# Patient Record
Sex: FEMALE | Race: WHITE | NOT HISPANIC OR LATINO | Employment: UNEMPLOYED | URBAN - METROPOLITAN AREA
[De-identification: names, ages, dates, MRNs, and addresses within clinical notes are randomized per-mention and may not be internally consistent; named-entity substitution may affect disease eponyms.]

---

## 2017-03-22 ENCOUNTER — OFFICE VISIT (OUTPATIENT)
Dept: ONCOLOGY | Facility: CLINIC | Age: 67
End: 2017-03-22

## 2017-03-22 ENCOUNTER — INFUSION (OUTPATIENT)
Dept: ONCOLOGY | Facility: HOSPITAL | Age: 67
End: 2017-03-22
Attending: INTERNAL MEDICINE

## 2017-03-22 ENCOUNTER — LAB (OUTPATIENT)
Dept: LAB | Facility: HOSPITAL | Age: 67
End: 2017-03-22

## 2017-03-22 VITALS
TEMPERATURE: 98.1 F | SYSTOLIC BLOOD PRESSURE: 152 MMHG | DIASTOLIC BLOOD PRESSURE: 80 MMHG | RESPIRATION RATE: 16 BRPM | OXYGEN SATURATION: 97 % | HEART RATE: 77 BPM | BODY MASS INDEX: 31.96 KG/M2 | WEIGHT: 191.8 LBS

## 2017-03-22 DIAGNOSIS — E53.8 B12 DEFICIENCY: ICD-10-CM

## 2017-03-22 DIAGNOSIS — E53.8 VITAMIN B12 DEFICIENCY: Primary | ICD-10-CM

## 2017-03-22 LAB
BASOPHILS # BLD AUTO: 0 10*3/MM3 (ref 0–0.1)
BASOPHILS NFR BLD AUTO: 0 % (ref 0–1.1)
DEPRECATED RDW RBC AUTO: 48.5 FL (ref 37–49)
EOSINOPHIL # BLD AUTO: 0 10*3/MM3 (ref 0–0.36)
EOSINOPHIL NFR BLD AUTO: 0 % (ref 1–5)
ERYTHROCYTE [DISTWIDTH] IN BLOOD BY AUTOMATED COUNT: 14.1 % (ref 11.7–14.5)
HCT VFR BLD AUTO: 35.9 % (ref 34–45)
HGB BLD-MCNC: 12.4 G/DL (ref 11.5–14.9)
IMM GRANULOCYTES # BLD: 0.01 10*3/MM3 (ref 0–0.03)
IMM GRANULOCYTES NFR BLD: 0.3 % (ref 0–0.5)
LYMPHOCYTES # BLD AUTO: 2.43 10*3/MM3 (ref 1–3.5)
LYMPHOCYTES NFR BLD AUTO: 63 % (ref 20–49)
MCH RBC QN AUTO: 32.5 PG (ref 27–33)
MCHC RBC AUTO-ENTMCNC: 34.5 G/DL (ref 32–35)
MCV RBC AUTO: 94 FL (ref 83–97)
MONOCYTES # BLD AUTO: 0.15 10*3/MM3 (ref 0.25–0.8)
MONOCYTES NFR BLD AUTO: 3.9 % (ref 4–12)
NEUTROPHILS # BLD AUTO: 1.27 10*3/MM3 (ref 1.5–7)
NEUTROPHILS NFR BLD AUTO: 32.8 % (ref 39–75)
NRBC BLD MANUAL-RTO: 0 /100 WBC (ref 0–0)
PLATELET # BLD AUTO: 49 10*3/MM3 (ref 150–375)
PMV BLD AUTO: 11.7 FL (ref 8.9–12.1)
RBC # BLD AUTO: 3.82 10*6/MM3 (ref 3.9–5)
WBC NRBC COR # BLD: 3.86 10*3/MM3 (ref 4–10)

## 2017-03-22 PROCEDURE — 99213 OFFICE O/P EST LOW 20 MIN: CPT | Performed by: INTERNAL MEDICINE

## 2017-03-22 PROCEDURE — 85025 COMPLETE CBC W/AUTO DIFF WBC: CPT

## 2017-03-22 PROCEDURE — 36416 COLLJ CAPILLARY BLOOD SPEC: CPT

## 2017-03-22 PROCEDURE — 25010000002 CYANOCOBALAMIN PER 1000 MCG: Performed by: INTERNAL MEDICINE

## 2017-03-22 PROCEDURE — 96372 THER/PROPH/DIAG INJ SC/IM: CPT | Performed by: INTERNAL MEDICINE

## 2017-03-22 RX ORDER — VENLAFAXINE 50 MG/1
TABLET ORAL 2 TIMES DAILY
Refills: 0 | COMMUNITY
Start: 2017-02-14

## 2017-03-22 RX ORDER — CYANOCOBALAMIN 1000 UG/ML
1000 INJECTION, SOLUTION INTRAMUSCULAR; SUBCUTANEOUS ONCE
Status: CANCELLED | OUTPATIENT
Start: 2017-04-19

## 2017-03-22 RX ORDER — TIZANIDINE 4 MG/1
TABLET ORAL
Refills: 0 | COMMUNITY
Start: 2017-03-15

## 2017-03-22 RX ORDER — LORAZEPAM 0.5 MG/1
TABLET ORAL
Refills: 0 | COMMUNITY
Start: 2017-03-06

## 2017-03-22 RX ORDER — CYANOCOBALAMIN 1000 UG/ML
1000 INJECTION, SOLUTION INTRAMUSCULAR; SUBCUTANEOUS ONCE
Status: COMPLETED | OUTPATIENT
Start: 2017-03-22 | End: 2017-03-22

## 2017-03-22 RX ADMIN — CYANOCOBALAMIN 1000 MCG: 1000 INJECTION, SOLUTION INTRAMUSCULAR; SUBCUTANEOUS at 15:20

## 2017-03-22 NOTE — PROGRESS NOTES
REASONS FOR FOLLOWUP:    1.  B12 deficiency.   2. Cirrhosis.   3. Splenomegaly.   4. Leukopenia.   5. Probable immune thrombocytopenia.   6. B cell chronic lymphocytic leukemia.   7. Bone marrow aspiration and biopsy 10/15/2012  8. Myelodysplasia.     History of Present Illness    Mrs. Sands returns today in somewhat delayed follow-up.  She has the expected and persistent cytopenias though denies any signs or symptoms related to her anemia or thrombocytopenia.    Past Medical History:   Diagnosis Date   • Arthritis    • B12 deficiency    • Cirrhosis    • Diabetes mellitus    • Hypertension    • Hypothyroidism    • Leukopenia    • Lymphocytic leukemia     B cell   • Myelodysplasia (myelodysplastic syndrome)    • Splenomegaly    • Thrombocytopenia        ONCOLOGIC HISTORY:  (History from previous dates can be found in the separate document.)    Current Outpatient Prescriptions on File Prior to Visit   Medication Sig Dispense Refill   • doxepin (SINEquan) 100 MG capsule take 1 capsule by mouth at bedtime  0   • HYDROcodone-acetaminophen (NORCO)  MG per tablet take 1 tablet by mouth every 6 hours if needed for pain  0   • levothyroxine (SYNTHROID, LEVOTHROID) 100 MCG tablet Take 1 tablet(s) every day by oral route.  0   • QUEtiapine (SEROquel) 300 MG tablet 2 tablets daily.  0   • [DISCONTINUED] lithium carbonate 150 MG capsule take 1 capsule by mouth at bedtime  0   • [DISCONTINUED] LORazepam (ATIVAN PO) Take  by mouth 3 (three) times a day.     • [DISCONTINUED] venlafaxine (EFFEXOR) 75 MG tablet 1 tablet daily.  0     No current facility-administered medications on file prior to visit.        ALLERGIES:     Allergies   Allergen Reactions   • Meperidine Hcl Nausea And Vomiting   • Sumycin [Tetracycline]        Social History     Social History   • Marital status:      Spouse name: N/A   • Number of children: N/A   • Years of education: N/A     Occupational History   •  Disabled     Social History  Main Topics   • Smoking status: Current Every Day Smoker   • Smokeless tobacco: Not on file   • Alcohol use Not on file   • Drug use: Not on file   • Sexual activity: Not on file     Other Topics Concern   • Not on file     Social History Narrative         Cancer-related family history is not on file.     Review of Systems  A comprehensive 14 point review of systems was performed and was negative except as mentioned.    Objective      Vitals:    03/22/17 1422   BP: 152/80   Pulse: 77   Resp: 16   Temp: 98.1 °F (36.7 °C)   TempSrc: Oral   SpO2: 97%   Weight: 191 lb 12.8 oz (87 kg)   Height: Comment: patient refused to get a new height   PainSc: 4  Comment: pain in RT shoulder,back leg pain     Current Status 3/22/2017   ECOG score 0       Physical Exam  /80  Pulse 77  Temp 98.1 °F (36.7 °C) (Oral)   Resp 16  Wt 191 lb 12.8 oz (87 kg)  SpO2 97%  BMI 31.96 kg/m2    General Appearance:    Alert, cooperative, no distress, appears stated age   Head:    Normocephalic, without obvious abnormality, atraumatic   Eyes:    PERRL, conjunctiva/corneas clear, EOM's intact, fundi     benign, both eyes        Ears:    Normal TM's and external ear canals, both ears   Nose:   Nares normal, septum midline, mucosa normal, no drainage    or sinus tenderness   Throat:   Lips, mucosa, and tongue normal; teeth and gums normal   Neck:   Supple, symmetrical, trachea midline, no adenopathy;        thyroid:  No enlargement/tenderness/nodules; no carotid    bruit or JVD   Back:     Symmetric, no curvature, ROM normal, no CVA tenderness   Lungs:     Clear to auscultation bilaterally, respirations unlabored   Chest wall:    No tenderness or deformity   Heart:    Regular rate and rhythm, S1 and S2 normal, no murmur, rub    or gallop   Abdomen:     Soft, non-tender, bowel sounds active all four quadrants,     no masses, no organomegaly           Extremities:   Extremities normal, atraumatic, no cyanosis or edema   Pulses:   2+ and  symmetric all extremities   Skin:   Skin color, texture, turgor normal, no rashes or lesions   Lymph nodes:   Cervical, supraclavicular, and axillary nodes normal   Neurologic:   CNII-XII intact. Normal strength, sensation and reflexes       throughout       RECENT LABS:  Hematology WBC   Date Value Ref Range Status   03/22/2017 3.86 (L) 4.00 - 10.00 10*3/mm3 Final     RBC   Date Value Ref Range Status   03/22/2017 3.82 (L) 3.90 - 5.00 10*6/mm3 Final     Hemoglobin   Date Value Ref Range Status   03/22/2017 12.4 11.5 - 14.9 g/dL Final     Hematocrit   Date Value Ref Range Status   03/22/2017 35.9 34.0 - 45.0 % Final     Platelets   Date Value Ref Range Status   03/22/2017 49 (L) 150 - 375 10*3/mm3 Final        Assessment/Plan   B12 deficiency: She will undergo B12 injection today and monthly hereafter she will return in the course of 6 months for M.D. Review.    Thrombocytopenia:slight increase in consumption per the purple Brianne on the way home though largely splenomegaly.no significant bleednig risk              Cc:  Salinas Blair MD

## 2017-04-19 ENCOUNTER — APPOINTMENT (OUTPATIENT)
Dept: LAB | Facility: HOSPITAL | Age: 67
End: 2017-04-19
Attending: INTERNAL MEDICINE

## 2017-04-19 ENCOUNTER — APPOINTMENT (OUTPATIENT)
Dept: ONCOLOGY | Facility: HOSPITAL | Age: 67
End: 2017-04-19
Attending: INTERNAL MEDICINE

## 2017-07-12 ENCOUNTER — APPOINTMENT (OUTPATIENT)
Dept: LAB | Facility: HOSPITAL | Age: 67
End: 2017-07-12

## 2017-07-12 ENCOUNTER — APPOINTMENT (OUTPATIENT)
Dept: ONCOLOGY | Facility: HOSPITAL | Age: 67
End: 2017-07-12

## 2017-09-05 DIAGNOSIS — E53.8 VITAMIN B12 DEFICIENCY: Primary | ICD-10-CM

## 2022-08-10 RX ORDER — CYANOCOBALAMIN 1000 UG/ML
1000 INJECTION, SOLUTION INTRAMUSCULAR; SUBCUTANEOUS ONCE
OUTPATIENT
Start: 2022-08-10

## 2022-08-10 NOTE — PROGRESS NOTES
REFERRING PROVIDER:    Jai Monroy MD  [address]  [address]  [place] [address]    REASON FOR CONSULTATION:  Thrombocytopenia    HISTORY OF PRESENT ILLNESS:  Gloria Sands is a 72 y.o. female who is referred today for further evaluation of thrombocytopenia.    Formerly followed by Dr. Casey with the practice for cytopenias, cirrhosis, splenomegaly, suspected ITP, MDS (?),  Chronic lymphocytic leukemia, B12 deficiency. Last seen in 2017.     Flow cytometry on 10/13/2010 showed a monoclonal B cell population consistent with B cell CLL vs monoclonal B cell lymphocytosis. The spleen measured 20 cm. MMA was elevated though B12 was normal at 608.     Bone marrow biopsy on 10/15/08251 showed a monoclonal B cell population, normal chromosomes. She was treated with a steroid taper which did not help her platelet count which was in the 50,000 to 70,000 range at the time.    Platelets on 3/22/2017 were 49,000 with a WBC of 3.86, HGB 12.4    Labs on 7/11/22 showed a hgb of 9.9, platelets 43,000, WBC 5.2. Platelets stable since 2020.     She cannot tell me anything that is going on today.  The person who brought her is from maintenance at the facility where she lives and also knows nothing about what is going on.        Past Medical History:   Diagnosis Date   • Arthritis    • B12 deficiency    • Cirrhosis (HCC)    • Diabetes mellitus (HCC)    • Hypertension    • Hypothyroidism    • Leukopenia    • Lymphocytic leukemia (HCC)     B cell   • Myelodysplasia (myelodysplastic syndrome) (HCC)    • Splenomegaly    • Thrombocytopenia (HCC)        Past Surgical History:   Procedure Laterality Date   • BONE MARROW BIOPSY W/ ASPIRATION  10/15/2012   • HYSTERECTOMY     • LUMBAR SPINE SURGERY     • OTHER SURGICAL HISTORY      MULTIPLE SURGERIES FOR PAIN MANAGEMENT APPLIANCES       SOCIAL HISTORY:   reports that she has been smoking. She does not have any smokeless tobacco history on file. No history on file for alcohol  use and drug use.    FAMILY HISTORY:  family history is not on file.    ALLERGIES:  Allergies   Allergen Reactions   • Meperidine Hcl Nausea And Vomiting   • Sumycin [Tetracycline]        MEDICATIONS:  The medication list has been reviewed with the patient by the medical assistant, and the list has been updated in the electronic medical record, which I reviewed.  Medication dosages and frequencies were confirmed to be accurate.    Review of Systems   Unable to perform ROS: Dementia       PHYSICAL EXAMINATION:  Vitals:    08/17/22 1510   BP: 133/55   Pulse: 54   Resp: 16   Temp: 97.8 °F (36.6 °C)   TempSrc: Temporal   SpO2: 92%   Weight: Comment: pt unable to stand   PainSc: 0-No pain     Unkempt in appearance.  Sitting in a wheelchair.  Disoriented.    DIAGNOSTIC DATA:  CBC & Differential (08/17/2022 14:32)    IMAGING:    None reviewed    ASSESSMENT:  This is a 72 y.o. 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    *Paranoid schizophrenia    *Dementia    PLAN:   1. Blood counts have been very stable for many years, related mostly to cirrhosis and splenomegaly.  Her white blood cell count is normal.  There is a lymphocytosis.  This has been evaluated in the past and there is a monoclonal B-cell population but there is nothing to do about this.  There is nothing to do for this unfortunate lady from a hematologic standpoint.  She can follow-up here as needed.    ORDERS PLACED DURING THIS ENCOUNTER  Orders Placed This Encounter   Procedures   • CBC & Differential     Standing Status:   Future     Number of  Occurrences:   1     Standing Expiration Date:   8/10/2023     Order Specific Question:   Manual Differential     Answer:   No     Order Specific Question:   Release to patient     Answer:   Routine Release     Copy of this note to [facility] and rehabilitation

## 2022-08-17 ENCOUNTER — CONSULT (OUTPATIENT)
Dept: ONCOLOGY | Facility: CLINIC | Age: 72
End: 2022-08-17

## 2022-08-17 ENCOUNTER — LAB (OUTPATIENT)
Dept: LAB | Facility: HOSPITAL | Age: 72
End: 2022-08-17

## 2022-08-17 VITALS
DIASTOLIC BLOOD PRESSURE: 55 MMHG | HEART RATE: 54 BPM | OXYGEN SATURATION: 92 % | SYSTOLIC BLOOD PRESSURE: 133 MMHG | RESPIRATION RATE: 16 BRPM | TEMPERATURE: 97.8 F

## 2022-08-17 DIAGNOSIS — D69.6 THROMBOCYTOPENIA: Primary | ICD-10-CM

## 2022-08-17 DIAGNOSIS — D69.6 THROMBOCYTOPENIA: ICD-10-CM

## 2022-08-17 LAB
BASOPHILS # BLD AUTO: 0.02 10*3/MM3 (ref 0–0.2)
BASOPHILS NFR BLD AUTO: 0.2 % (ref 0–1.5)
DEPRECATED RDW RBC AUTO: 52.2 FL (ref 37–54)
EOSINOPHIL # BLD AUTO: 0.01 10*3/MM3 (ref 0–0.4)
EOSINOPHIL NFR BLD AUTO: 0.1 % (ref 0.3–6.2)
ERYTHROCYTE [DISTWIDTH] IN BLOOD BY AUTOMATED COUNT: 14.7 % (ref 12.3–15.4)
HCT VFR BLD AUTO: 30.6 % (ref 34–46.6)
HGB BLD-MCNC: 10.5 G/DL (ref 12–15.9)
IMM GRANULOCYTES # BLD AUTO: 0.02 10*3/MM3 (ref 0–0.05)
IMM GRANULOCYTES NFR BLD AUTO: 0.2 % (ref 0–0.5)
LYMPHOCYTES # BLD AUTO: 6.54 10*3/MM3 (ref 0.7–3.1)
LYMPHOCYTES NFR BLD AUTO: 71.9 % (ref 19.6–45.3)
MCH RBC QN AUTO: 33.1 PG (ref 26.6–33)
MCHC RBC AUTO-ENTMCNC: 34.3 G/DL (ref 31.5–35.7)
MCV RBC AUTO: 96.5 FL (ref 79–97)
MONOCYTES # BLD AUTO: 0.53 10*3/MM3 (ref 0.1–0.9)
MONOCYTES NFR BLD AUTO: 5.8 % (ref 5–12)
NEUTROPHILS NFR BLD AUTO: 1.97 10*3/MM3 (ref 1.7–7)
NEUTROPHILS NFR BLD AUTO: 21.8 % (ref 42.7–76)
NRBC BLD AUTO-RTO: 0.2 /100 WBC (ref 0–0.2)
PLATELET # BLD AUTO: 54 10*3/MM3 (ref 140–450)
PMV BLD AUTO: 12.5 FL (ref 6–12)
RBC # BLD AUTO: 3.17 10*6/MM3 (ref 3.77–5.28)
WBC NRBC COR # BLD: 9.09 10*3/MM3 (ref 3.4–10.8)

## 2022-08-17 PROCEDURE — 99203 OFFICE O/P NEW LOW 30 MIN: CPT | Performed by: INTERNAL MEDICINE

## 2022-08-17 PROCEDURE — 36415 COLL VENOUS BLD VENIPUNCTURE: CPT

## 2022-08-17 PROCEDURE — 85025 COMPLETE CBC W/AUTO DIFF WBC: CPT

## 2022-08-17 RX ORDER — DONEPEZIL HYDROCHLORIDE 10 MG/1
TABLET, FILM COATED ORAL
COMMUNITY
Start: 2022-08-15

## 2022-08-17 RX ORDER — ARIPIPRAZOLE 2 MG/1
TABLET ORAL
COMMUNITY
Start: 2022-07-25

## 2022-08-17 RX ORDER — TRIAMCINOLONE ACETONIDE 5 MG/G
CREAM TOPICAL
COMMUNITY
Start: 2022-06-17

## 2022-08-17 RX ORDER — SERTRALINE HYDROCHLORIDE 100 MG/1
TABLET, FILM COATED ORAL
COMMUNITY
Start: 2022-07-31

## 2022-08-17 RX ORDER — LORAZEPAM 0.5 MG/1
TABLET ORAL
COMMUNITY

## 2022-08-17 RX ORDER — MIRTAZAPINE 7.5 MG/1
TABLET, FILM COATED ORAL
COMMUNITY
Start: 2022-07-29

## 2022-08-17 RX ORDER — LOPERAMIDE HYDROCHLORIDE 2 MG/1
CAPSULE ORAL
COMMUNITY
Start: 2022-08-14

## 2023-01-01 ENCOUNTER — HOSPITAL ENCOUNTER (INPATIENT)
Facility: HOSPITAL | Age: 73
LOS: 7 days | Discharge: NURSING, REHAB OR LONG-TERM CARE | End: 2023-10-24
Attending: EMERGENCY MEDICINE | Admitting: INTERNAL MEDICINE
Payer: MEDICARE

## 2023-01-01 ENCOUNTER — TELEPHONE (OUTPATIENT)
Dept: ONCOLOGY | Facility: CLINIC | Age: 73
End: 2023-01-01

## 2023-01-01 ENCOUNTER — APPOINTMENT (OUTPATIENT)
Dept: GENERAL RADIOLOGY | Facility: HOSPITAL | Age: 73
End: 2023-01-01
Payer: MEDICARE

## 2023-01-01 ENCOUNTER — HOSPITAL ENCOUNTER (INPATIENT)
Facility: HOSPITAL | Age: 73
LOS: 3 days | End: 2023-10-27
Attending: INTERNAL MEDICINE | Admitting: INTERNAL MEDICINE
Payer: COMMERCIAL

## 2023-01-01 ENCOUNTER — APPOINTMENT (OUTPATIENT)
Dept: CT IMAGING | Facility: HOSPITAL | Age: 73
End: 2023-01-01
Payer: MEDICARE

## 2023-01-01 VITALS — RESPIRATION RATE: 12 BRPM | OXYGEN SATURATION: 91 % | HEART RATE: 65 BPM

## 2023-01-01 VITALS
DIASTOLIC BLOOD PRESSURE: 40 MMHG | TEMPERATURE: 98.4 F | HEART RATE: 159 BPM | RESPIRATION RATE: 8 BRPM | SYSTOLIC BLOOD PRESSURE: 67 MMHG | OXYGEN SATURATION: 83 % | WEIGHT: 89.3 LBS | HEIGHT: 63 IN | BODY MASS INDEX: 15.82 KG/M2

## 2023-01-01 DIAGNOSIS — R62.7 FAILURE TO THRIVE IN ADULT: ICD-10-CM

## 2023-01-01 DIAGNOSIS — R63.8 DECREASED ORAL INTAKE: ICD-10-CM

## 2023-01-01 DIAGNOSIS — D53.9 MACROCYTIC ANEMIA: ICD-10-CM

## 2023-01-01 DIAGNOSIS — R79.89 ELEVATED TROPONIN: ICD-10-CM

## 2023-01-01 DIAGNOSIS — D69.6 THROMBOCYTOPENIA: ICD-10-CM

## 2023-01-01 DIAGNOSIS — E87.0 HYPERNATREMIA: Primary | ICD-10-CM

## 2023-01-01 DIAGNOSIS — C91.10 CLL (CHRONIC LYMPHOCYTIC LEUKEMIA): ICD-10-CM

## 2023-01-01 DIAGNOSIS — D72.820 LYMPHOCYTOSIS: ICD-10-CM

## 2023-01-01 DIAGNOSIS — R09.02 HYPOXIA: ICD-10-CM

## 2023-01-01 DIAGNOSIS — J18.9 PNEUMONIA DUE TO INFECTIOUS ORGANISM, UNSPECIFIED LATERALITY, UNSPECIFIED PART OF LUNG: ICD-10-CM

## 2023-01-01 LAB
ALBUMIN SERPL-MCNC: 2.8 G/DL (ref 3.5–5.2)
ALBUMIN/GLOB SERPL: 0.9 G/DL
ALP SERPL-CCNC: 86 U/L (ref 39–117)
ALT SERPL W P-5'-P-CCNC: 9 U/L (ref 1–33)
AMMONIA BLD-SCNC: 31 UMOL/L (ref 11–51)
ANION GAP SERPL CALCULATED.3IONS-SCNC: 10 MMOL/L (ref 5–15)
ANION GAP SERPL CALCULATED.3IONS-SCNC: 10.5 MMOL/L (ref 5–15)
ANION GAP SERPL CALCULATED.3IONS-SCNC: 12 MMOL/L (ref 5–15)
ANION GAP SERPL CALCULATED.3IONS-SCNC: 8 MMOL/L (ref 5–15)
ARTERIAL PATENCY WRIST A: POSITIVE
AST SERPL-CCNC: 29 U/L (ref 1–32)
ATMOSPHERIC PRESS: 750.9 MMHG
B PARAPERT DNA SPEC QL NAA+PROBE: NOT DETECTED
B PERT DNA SPEC QL NAA+PROBE: NOT DETECTED
BACTERIA ISLT: NORMAL
BACTERIA SPEC AEROBE CULT: NORMAL
BACTERIA SPEC AEROBE CULT: NORMAL
BACTERIA UR QL AUTO: NORMAL /HPF
BASE EXCESS BLDA CALC-SCNC: 3.3 MMOL/L (ref 0–2)
BASOPHILS # BLD MANUAL: 0.17 10*3/MM3 (ref 0–0.2)
BASOPHILS NFR BLD MANUAL: 1 % (ref 0–1.5)
BDY SITE: ABNORMAL
BILIRUB SERPL-MCNC: 2 MG/DL (ref 0–1.2)
BILIRUB UR QL STRIP: NEGATIVE
BUN SERPL-MCNC: 24 MG/DL (ref 8–23)
BUN SERPL-MCNC: 31 MG/DL (ref 8–23)
BUN SERPL-MCNC: 34 MG/DL (ref 8–23)
BUN SERPL-MCNC: 41 MG/DL (ref 8–23)
BUN/CREAT SERPL: 53.3 (ref 7–25)
BUN/CREAT SERPL: 59.6 (ref 7–25)
BUN/CREAT SERPL: 60.7 (ref 7–25)
BUN/CREAT SERPL: 63.1 (ref 7–25)
BURR CELLS BLD QL SMEAR: ABNORMAL
BURR CELLS BLD QL SMEAR: ABNORMAL
C PNEUM DNA NPH QL NAA+NON-PROBE: NOT DETECTED
CALCIUM SPEC-SCNC: 7.4 MG/DL (ref 8.6–10.5)
CALCIUM SPEC-SCNC: 7.9 MG/DL (ref 8.6–10.5)
CALCIUM SPEC-SCNC: 8.5 MG/DL (ref 8.6–10.5)
CALCIUM SPEC-SCNC: 9.8 MG/DL (ref 8.6–10.5)
CHLORIDE SERPL-SCNC: 117 MMOL/L (ref 98–107)
CHLORIDE SERPL-SCNC: 126 MMOL/L (ref 98–107)
CHLORIDE SERPL-SCNC: 133 MMOL/L (ref 98–107)
CHLORIDE SERPL-SCNC: 135 MMOL/L (ref 98–107)
CLARITY UR: CLEAR
CO2 BLDA-SCNC: 28.5 MMOL/L (ref 23–27)
CO2 SERPL-SCNC: 20 MMOL/L (ref 22–29)
CO2 SERPL-SCNC: 22 MMOL/L (ref 22–29)
CO2 SERPL-SCNC: 22.5 MMOL/L (ref 22–29)
CO2 SERPL-SCNC: 30 MMOL/L (ref 22–29)
COLOR UR: ABNORMAL
CREAT SERPL-MCNC: 0.45 MG/DL (ref 0.57–1)
CREAT SERPL-MCNC: 0.52 MG/DL (ref 0.57–1)
CREAT SERPL-MCNC: 0.56 MG/DL (ref 0.57–1)
CREAT SERPL-MCNC: 0.65 MG/DL (ref 0.57–1)
D-LACTATE SERPL-SCNC: 2.3 MMOL/L (ref 0.5–2)
D-LACTATE SERPL-SCNC: 2.7 MMOL/L (ref 0.5–2)
D-LACTATE SERPL-SCNC: 3.5 MMOL/L (ref 0.5–2)
D-LACTATE SERPL-SCNC: 3.6 MMOL/L (ref 0.5–2)
D-LACTATE SERPL-SCNC: 3.6 MMOL/L (ref 0.5–2)
D-LACTATE SERPL-SCNC: 3.9 MMOL/L (ref 0.5–2)
DEPRECATED RDW RBC AUTO: 47.5 FL (ref 37–54)
DEPRECATED RDW RBC AUTO: 50.6 FL (ref 37–54)
DEPRECATED RDW RBC AUTO: 52.5 FL (ref 37–54)
DEPRECATED RDW RBC AUTO: 53.1 FL (ref 37–54)
DEVICE COMMENT: ABNORMAL
EGFRCR SERPLBLD CKD-EPI 2021: 100.5 ML/MIN/1.73
EGFRCR SERPLBLD CKD-EPI 2021: 91.9 ML/MIN/1.73
EGFRCR SERPLBLD CKD-EPI 2021: 95.3 ML/MIN/1.73
EGFRCR SERPLBLD CKD-EPI 2021: 97 ML/MIN/1.73
ERYTHROCYTE [DISTWIDTH] IN BLOOD BY AUTOMATED COUNT: 13.8 % (ref 12.3–15.4)
ERYTHROCYTE [DISTWIDTH] IN BLOOD BY AUTOMATED COUNT: 14.1 % (ref 12.3–15.4)
ERYTHROCYTE [DISTWIDTH] IN BLOOD BY AUTOMATED COUNT: 14.4 % (ref 12.3–15.4)
ERYTHROCYTE [DISTWIDTH] IN BLOOD BY AUTOMATED COUNT: 14.6 % (ref 12.3–15.4)
FLUAV SUBTYP SPEC NAA+PROBE: NOT DETECTED
FLUBV RNA ISLT QL NAA+PROBE: NOT DETECTED
GAS FLOW AIRWAY: 6 LPM
GEN 5 2HR TROPONIN T REFLEX: 140 NG/L
GLOBULIN UR ELPH-MCNC: 3 GM/DL
GLUCOSE BLDC GLUCOMTR-MCNC: 110 MG/DL (ref 70–130)
GLUCOSE BLDC GLUCOMTR-MCNC: 119 MG/DL (ref 70–130)
GLUCOSE BLDC GLUCOMTR-MCNC: 151 MG/DL (ref 70–130)
GLUCOSE BLDC GLUCOMTR-MCNC: 162 MG/DL (ref 70–130)
GLUCOSE BLDC GLUCOMTR-MCNC: 169 MG/DL (ref 70–130)
GLUCOSE BLDC GLUCOMTR-MCNC: 186 MG/DL (ref 70–130)
GLUCOSE BLDC GLUCOMTR-MCNC: 187 MG/DL (ref 70–130)
GLUCOSE BLDC GLUCOMTR-MCNC: 191 MG/DL (ref 70–130)
GLUCOSE BLDC GLUCOMTR-MCNC: 205 MG/DL (ref 70–130)
GLUCOSE BLDC GLUCOMTR-MCNC: 211 MG/DL (ref 70–130)
GLUCOSE SERPL-MCNC: 148 MG/DL (ref 65–99)
GLUCOSE SERPL-MCNC: 167 MG/DL (ref 65–99)
GLUCOSE SERPL-MCNC: 194 MG/DL (ref 65–99)
GLUCOSE SERPL-MCNC: 215 MG/DL (ref 65–99)
GLUCOSE UR STRIP-MCNC: NEGATIVE MG/DL
HADV DNA SPEC NAA+PROBE: NOT DETECTED
HBA1C MFR BLD: 5.4 % (ref 4.8–5.6)
HCO3 BLDA-SCNC: 27.3 MMOL/L (ref 22–28)
HCOV 229E RNA SPEC QL NAA+PROBE: NOT DETECTED
HCOV HKU1 RNA SPEC QL NAA+PROBE: NOT DETECTED
HCOV NL63 RNA SPEC QL NAA+PROBE: NOT DETECTED
HCOV OC43 RNA SPEC QL NAA+PROBE: NOT DETECTED
HCT VFR BLD AUTO: 27.9 % (ref 34–46.6)
HCT VFR BLD AUTO: 28.8 % (ref 34–46.6)
HCT VFR BLD AUTO: 32.9 % (ref 34–46.6)
HCT VFR BLD AUTO: 37.1 % (ref 34–46.6)
HEMODILUTION: NO
HGB BLD-MCNC: 10 G/DL (ref 12–15.9)
HGB BLD-MCNC: 11.2 G/DL (ref 12–15.9)
HGB BLD-MCNC: 12.9 G/DL (ref 12–15.9)
HGB BLD-MCNC: 9.8 G/DL (ref 12–15.9)
HGB UR QL STRIP.AUTO: NEGATIVE
HMPV RNA NPH QL NAA+NON-PROBE: NOT DETECTED
HPIV1 RNA ISLT QL NAA+PROBE: NOT DETECTED
HPIV2 RNA SPEC QL NAA+PROBE: NOT DETECTED
HPIV3 RNA NPH QL NAA+PROBE: NOT DETECTED
HPIV4 P GENE NPH QL NAA+PROBE: NOT DETECTED
HYALINE CASTS UR QL AUTO: NORMAL /LPF
KETONES UR QL STRIP: ABNORMAL
LAB AP CASE REPORT: NORMAL
LEUKOCYTE ESTERASE UR QL STRIP.AUTO: ABNORMAL
LYMPHOCYTES # BLD MANUAL: 32.37 10*3/MM3 (ref 0.7–3.1)
LYMPHOCYTES # BLD MANUAL: 4.79 10*3/MM3 (ref 0.7–3.1)
LYMPHOCYTES # BLD MANUAL: 6.32 10*3/MM3 (ref 0.7–3.1)
LYMPHOCYTES # BLD MANUAL: 9.44 10*3/MM3 (ref 0.7–3.1)
LYMPHOCYTES NFR BLD MANUAL: 1.6 % (ref 5–12)
LYMPHOCYTES NFR BLD MANUAL: 2 % (ref 5–12)
LYMPHOCYTES NFR BLD MANUAL: 3 % (ref 5–12)
LYMPHOCYTES NFR BLD MANUAL: 3 % (ref 5–12)
M PNEUMO IGG SER IA-ACNC: NOT DETECTED
MAGNESIUM SERPL-MCNC: 3 MG/DL (ref 1.6–2.4)
MCH RBC QN AUTO: 33.9 PG (ref 26.6–33)
MCH RBC QN AUTO: 34.4 PG (ref 26.6–33)
MCH RBC QN AUTO: 34.7 PG (ref 26.6–33)
MCH RBC QN AUTO: 34.9 PG (ref 26.6–33)
MCHC RBC AUTO-ENTMCNC: 34 G/DL (ref 31.5–35.7)
MCHC RBC AUTO-ENTMCNC: 34 G/DL (ref 31.5–35.7)
MCHC RBC AUTO-ENTMCNC: 34.8 G/DL (ref 31.5–35.7)
MCHC RBC AUTO-ENTMCNC: 35.8 G/DL (ref 31.5–35.7)
MCV RBC AUTO: 102.5 FL (ref 79–97)
MCV RBC AUTO: 95.9 FL (ref 79–97)
MCV RBC AUTO: 99.7 FL (ref 79–97)
MCV RBC AUTO: 99.7 FL (ref 79–97)
MODALITY: ABNORMAL
MONOCYTES # BLD: 0.24 10*3/MM3 (ref 0.1–0.9)
MONOCYTES # BLD: 0.25 10*3/MM3 (ref 0.1–0.9)
MONOCYTES # BLD: 0.5 10*3/MM3 (ref 0.1–0.9)
MONOCYTES # BLD: 1.21 10*3/MM3 (ref 0.1–0.9)
MRSA DNA SPEC QL NAA+PROBE: NORMAL
NEUTROPHILS # BLD AUTO: 11.06 10*3/MM3 (ref 1.7–7)
NEUTROPHILS # BLD AUTO: 2.27 10*3/MM3 (ref 1.7–7)
NEUTROPHILS # BLD AUTO: 6.88 10*3/MM3 (ref 1.7–7)
NEUTROPHILS # BLD AUTO: 9.23 10*3/MM3 (ref 1.7–7)
NEUTROPHILS NFR BLD MANUAL: 17 % (ref 42.7–76)
NEUTROPHILS NFR BLD MANUAL: 19 % (ref 42.7–76)
NEUTROPHILS NFR BLD MANUAL: 58.4 % (ref 42.7–76)
NEUTROPHILS NFR BLD MANUAL: 67 % (ref 42.7–76)
NITRITE UR QL STRIP: NEGATIVE
NT-PROBNP SERPL-MCNC: 1477 PG/ML (ref 0–1800)
OVALOCYTES BLD QL SMEAR: ABNORMAL
PATH REPORT.FINAL DX SPEC: NORMAL
PATHOLOGY REVIEW: YES
PCO2 BLDA: 38.8 MM HG (ref 35–45)
PH BLDA: 7.46 PH UNITS (ref 7.35–7.45)
PH UR STRIP.AUTO: 6 [PH] (ref 5–8)
PLAT MORPH BLD: NORMAL
PLATELET # BLD AUTO: 19 10*3/MM3 (ref 140–450)
PLATELET # BLD AUTO: 20 10*3/MM3 (ref 140–450)
PLATELET # BLD AUTO: 28 10*3/MM3 (ref 140–450)
PLATELET # BLD AUTO: 47 10*3/MM3 (ref 140–450)
PMV BLD AUTO: 12.8 FL (ref 6–12)
PMV BLD AUTO: 13.6 FL (ref 6–12)
PO2 BLDA: 71.6 MM HG (ref 80–100)
POIKILOCYTOSIS BLD QL SMEAR: ABNORMAL
POIKILOCYTOSIS BLD QL SMEAR: ABNORMAL
POTASSIUM SERPL-SCNC: 2.7 MMOL/L (ref 3.5–5.2)
POTASSIUM SERPL-SCNC: 2.7 MMOL/L (ref 3.5–5.2)
POTASSIUM SERPL-SCNC: 2.9 MMOL/L (ref 3.5–5.2)
POTASSIUM SERPL-SCNC: 3.5 MMOL/L (ref 3.5–5.2)
POTASSIUM SERPL-SCNC: 4.3 MMOL/L (ref 3.5–5.2)
PROT SERPL-MCNC: 5.8 G/DL (ref 6–8.5)
PROT UR QL STRIP: ABNORMAL
QT INTERVAL: 386 MS
QT INTERVAL: 421 MS
QT INTERVAL: 498 MS
QTC INTERVAL: 459 MS
QTC INTERVAL: 471 MS
QTC INTERVAL: 630 MS
RBC # BLD AUTO: 2.89 10*6/MM3 (ref 3.77–5.28)
RBC # BLD AUTO: 2.91 10*6/MM3 (ref 3.77–5.28)
RBC # BLD AUTO: 3.21 10*6/MM3 (ref 3.77–5.28)
RBC # BLD AUTO: 3.72 10*6/MM3 (ref 3.77–5.28)
RBC # UR STRIP: NORMAL /HPF
RBC MORPH BLD: NORMAL
RBC MORPH BLD: NORMAL
REF LAB TEST METHOD: NORMAL
RHINOVIRUS RNA SPEC NAA+PROBE: NOT DETECTED
RSV RNA NPH QL NAA+NON-PROBE: NOT DETECTED
SAO2 % BLDCOA: 95 % (ref 92–98.5)
SARS-COV-2 RNA NPH QL NAA+NON-PROBE: NOT DETECTED
SET MECH RESP RATE: 24
SMUDGE CELLS BLD QL SMEAR: ABNORMAL
SMUDGE CELLS BLD QL SMEAR: ABNORMAL
SODIUM SERPL-SCNC: 145 MMOL/L (ref 136–145)
SODIUM SERPL-SCNC: 160 MMOL/L (ref 136–145)
SODIUM SERPL-SCNC: 166 MMOL/L (ref 136–145)
SODIUM SERPL-SCNC: 175 MMOL/L (ref 136–145)
SP GR UR STRIP: 1.02 (ref 1–1.03)
SQUAMOUS #/AREA URNS HPF: NORMAL /HPF
TOTAL RATE: 24 BREATHS/MINUTE
TROPONIN T DELTA: 4 NG/L
TROPONIN T SERPL HS-MCNC: 136 NG/L
TSH SERPL DL<=0.05 MIU/L-ACNC: 4.64 UIU/ML (ref 0.27–4.2)
UROBILINOGEN UR QL STRIP: ABNORMAL
VARIANT LYMPHS NFR BLD MANUAL: 29 % (ref 19.6–45.3)
VARIANT LYMPHS NFR BLD MANUAL: 40 % (ref 19.6–45.3)
VARIANT LYMPHS NFR BLD MANUAL: 79 % (ref 19.6–45.3)
VARIANT LYMPHS NFR BLD MANUAL: 80 % (ref 19.6–45.3)
WBC # UR STRIP: NORMAL /HPF
WBC MORPH BLD: NORMAL
WBC MORPH BLD: NORMAL
WBC NRBC COR # BLD: 11.95 10*3/MM3 (ref 3.4–10.8)
WBC NRBC COR # BLD: 15.8 10*3/MM3 (ref 3.4–10.8)
WBC NRBC COR # BLD: 16.5 10*3/MM3 (ref 3.4–10.8)
WBC NRBC COR # BLD: 40.46 10*3/MM3 (ref 3.4–10.8)

## 2023-01-01 PROCEDURE — 85025 COMPLETE CBC W/AUTO DIFF WBC: CPT | Performed by: INTERNAL MEDICINE

## 2023-01-01 PROCEDURE — 25010000002 LORAZEPAM PER 2 MG: Performed by: INTERNAL MEDICINE

## 2023-01-01 PROCEDURE — 83880 ASSAY OF NATRIURETIC PEPTIDE: CPT | Performed by: EMERGENCY MEDICINE

## 2023-01-01 PROCEDURE — 0 DEXTROSE 5 % SOLUTION: Performed by: INTERNAL MEDICINE

## 2023-01-01 PROCEDURE — 83605 ASSAY OF LACTIC ACID: CPT | Performed by: EMERGENCY MEDICINE

## 2023-01-01 PROCEDURE — 25010000002 MORPHINE PER 10 MG: Performed by: INTERNAL MEDICINE

## 2023-01-01 PROCEDURE — 82140 ASSAY OF AMMONIA: CPT | Performed by: INTERNAL MEDICINE

## 2023-01-01 PROCEDURE — 63710000001 INSULIN LISPRO (HUMAN) PER 5 UNITS: Performed by: INTERNAL MEDICINE

## 2023-01-01 PROCEDURE — 84132 ASSAY OF SERUM POTASSIUM: CPT | Performed by: INTERNAL MEDICINE

## 2023-01-01 PROCEDURE — 85007 BL SMEAR W/DIFF WBC COUNT: CPT | Performed by: EMERGENCY MEDICINE

## 2023-01-01 PROCEDURE — 82803 BLOOD GASES ANY COMBINATION: CPT

## 2023-01-01 PROCEDURE — 81001 URINALYSIS AUTO W/SCOPE: CPT | Performed by: EMERGENCY MEDICINE

## 2023-01-01 PROCEDURE — 99232 SBSQ HOSP IP/OBS MODERATE 35: CPT | Performed by: INTERNAL MEDICINE

## 2023-01-01 PROCEDURE — 80048 BASIC METABOLIC PNL TOTAL CA: CPT | Performed by: INTERNAL MEDICINE

## 2023-01-01 PROCEDURE — 93010 ELECTROCARDIOGRAM REPORT: CPT | Performed by: INTERNAL MEDICINE

## 2023-01-01 PROCEDURE — 83735 ASSAY OF MAGNESIUM: CPT | Performed by: EMERGENCY MEDICINE

## 2023-01-01 PROCEDURE — 84443 ASSAY THYROID STIM HORMONE: CPT | Performed by: EMERGENCY MEDICINE

## 2023-01-01 PROCEDURE — 25010000002 PIPERACILLIN SOD-TAZOBACTAM PER 1 G: Performed by: EMERGENCY MEDICINE

## 2023-01-01 PROCEDURE — 85025 COMPLETE CBC W/AUTO DIFF WBC: CPT | Performed by: EMERGENCY MEDICINE

## 2023-01-01 PROCEDURE — 25010000002 HYDROMORPHONE PER 4 MG: Performed by: INTERNAL MEDICINE

## 2023-01-01 PROCEDURE — 25010000002 HYDROMORPHONE 1 MG/ML SOLUTION: Performed by: INTERNAL MEDICINE

## 2023-01-01 PROCEDURE — 70450 CT HEAD/BRAIN W/O DYE: CPT

## 2023-01-01 PROCEDURE — 94761 N-INVAS EAR/PLS OXIMETRY MLT: CPT

## 2023-01-01 PROCEDURE — 25810000003 SODIUM CHLORIDE 0.9 % SOLUTION: Performed by: INTERNAL MEDICINE

## 2023-01-01 PROCEDURE — 25010000002 CEFEPIME PER 500 MG: Performed by: INTERNAL MEDICINE

## 2023-01-01 PROCEDURE — 84484 ASSAY OF TROPONIN QUANT: CPT | Performed by: EMERGENCY MEDICINE

## 2023-01-01 PROCEDURE — 36415 COLL VENOUS BLD VENIPUNCTURE: CPT

## 2023-01-01 PROCEDURE — 80053 COMPREHEN METABOLIC PANEL: CPT | Performed by: INTERNAL MEDICINE

## 2023-01-01 PROCEDURE — 99285 EMERGENCY DEPT VISIT HI MDM: CPT

## 2023-01-01 PROCEDURE — 82948 REAGENT STRIP/BLOOD GLUCOSE: CPT

## 2023-01-01 PROCEDURE — 0202U NFCT DS 22 TRGT SARS-COV-2: CPT | Performed by: EMERGENCY MEDICINE

## 2023-01-01 PROCEDURE — 87102 FUNGUS ISOLATION CULTURE: CPT | Performed by: INTERNAL MEDICINE

## 2023-01-01 PROCEDURE — 25810000003 SODIUM CHLORIDE 0.9 % SOLUTION 250 ML FLEX CONT: Performed by: INTERNAL MEDICINE

## 2023-01-01 PROCEDURE — 25010000002 POTASSIUM CHLORIDE 10 MEQ/100ML SOLUTION: Performed by: INTERNAL MEDICINE

## 2023-01-01 PROCEDURE — 87040 BLOOD CULTURE FOR BACTERIA: CPT | Performed by: EMERGENCY MEDICINE

## 2023-01-01 PROCEDURE — 80048 BASIC METABOLIC PNL TOTAL CA: CPT | Performed by: EMERGENCY MEDICINE

## 2023-01-01 PROCEDURE — 94799 UNLISTED PULMONARY SVC/PX: CPT

## 2023-01-01 PROCEDURE — 87641 MR-STAPH DNA AMP PROBE: CPT | Performed by: INTERNAL MEDICINE

## 2023-01-01 PROCEDURE — 94760 N-INVAS EAR/PLS OXIMETRY 1: CPT

## 2023-01-01 PROCEDURE — 85007 BL SMEAR W/DIFF WBC COUNT: CPT | Performed by: INTERNAL MEDICINE

## 2023-01-01 PROCEDURE — 36600 WITHDRAWAL OF ARTERIAL BLOOD: CPT

## 2023-01-01 PROCEDURE — P9612 CATHETERIZE FOR URINE SPEC: HCPCS

## 2023-01-01 PROCEDURE — 71045 X-RAY EXAM CHEST 1 VIEW: CPT

## 2023-01-01 PROCEDURE — 93005 ELECTROCARDIOGRAM TRACING: CPT | Performed by: EMERGENCY MEDICINE

## 2023-01-01 PROCEDURE — 83036 HEMOGLOBIN GLYCOSYLATED A1C: CPT | Performed by: INTERNAL MEDICINE

## 2023-01-01 PROCEDURE — 99222 1ST HOSP IP/OBS MODERATE 55: CPT | Performed by: INTERNAL MEDICINE

## 2023-01-01 PROCEDURE — 84484 ASSAY OF TROPONIN QUANT: CPT | Performed by: INTERNAL MEDICINE

## 2023-01-01 PROCEDURE — 25010000002 VANCOMYCIN 750 MG RECONSTITUTED SOLUTION 1 EACH VIAL: Performed by: INTERNAL MEDICINE

## 2023-01-01 PROCEDURE — 0 DEXTROSE 5 % SOLUTION: Performed by: EMERGENCY MEDICINE

## 2023-01-01 PROCEDURE — 93005 ELECTROCARDIOGRAM TRACING: CPT | Performed by: INTERNAL MEDICINE

## 2023-01-01 RX ORDER — LORAZEPAM 2 MG/ML
0.5 INJECTION INTRAMUSCULAR
Status: CANCELLED | OUTPATIENT
Start: 2023-01-01 | End: 2023-01-01

## 2023-01-01 RX ORDER — MORPHINE SULFATE 2 MG/ML
2 INJECTION, SOLUTION INTRAMUSCULAR; INTRAVENOUS
Status: ACTIVE | OUTPATIENT
Start: 2023-01-01 | End: 2023-01-01

## 2023-01-01 RX ORDER — ONDANSETRON 2 MG/ML
4 INJECTION INTRAMUSCULAR; INTRAVENOUS EVERY 6 HOURS PRN
Status: CANCELLED | OUTPATIENT
Start: 2023-01-01

## 2023-01-01 RX ORDER — MORPHINE SULFATE 4 MG/ML
4 INJECTION, SOLUTION INTRAMUSCULAR; INTRAVENOUS
Status: DISCONTINUED | OUTPATIENT
Start: 2023-01-01 | End: 2023-01-01 | Stop reason: HOSPADM

## 2023-01-01 RX ORDER — LORAZEPAM 2 MG/ML
2 INJECTION INTRAMUSCULAR
Status: DISCONTINUED | OUTPATIENT
Start: 2023-01-01 | End: 2023-01-01 | Stop reason: HOSPADM

## 2023-01-01 RX ORDER — LIDOCAINE HYDROCHLORIDE 20 MG/ML
5 SOLUTION OROPHARYNGEAL EVERY 4 HOURS PRN
Status: DISCONTINUED | OUTPATIENT
Start: 2023-01-01 | End: 2023-01-01 | Stop reason: HOSPADM

## 2023-01-01 RX ORDER — ACETAMINOPHEN 160 MG/5ML
650 SOLUTION ORAL EVERY 4 HOURS PRN
Status: DISCONTINUED | OUTPATIENT
Start: 2023-01-01 | End: 2023-01-01 | Stop reason: HOSPADM

## 2023-01-01 RX ORDER — ONDANSETRON 2 MG/ML
4 INJECTION INTRAMUSCULAR; INTRAVENOUS EVERY 6 HOURS PRN
Status: DISCONTINUED | OUTPATIENT
Start: 2023-01-01 | End: 2023-01-01 | Stop reason: HOSPADM

## 2023-01-01 RX ORDER — MORPHINE SULFATE 20 MG/ML
5 SOLUTION ORAL
Status: ACTIVE | OUTPATIENT
Start: 2023-01-01 | End: 2023-01-01

## 2023-01-01 RX ORDER — LORAZEPAM 2 MG/ML
2 CONCENTRATE ORAL
Status: DISCONTINUED | OUTPATIENT
Start: 2023-01-01 | End: 2023-01-01 | Stop reason: HOSPADM

## 2023-01-01 RX ORDER — IBUPROFEN 600 MG/1
1 TABLET ORAL
Status: DISCONTINUED | OUTPATIENT
Start: 2023-01-01 | End: 2023-01-01

## 2023-01-01 RX ORDER — LORAZEPAM 2 MG/ML
0.5 CONCENTRATE ORAL
Status: CANCELLED | OUTPATIENT
Start: 2023-01-01 | End: 2023-01-01

## 2023-01-01 RX ORDER — KETOROLAC TROMETHAMINE 15 MG/ML
15 INJECTION, SOLUTION INTRAMUSCULAR; INTRAVENOUS EVERY 6 HOURS PRN
Status: ACTIVE | OUTPATIENT
Start: 2023-01-01 | End: 2023-01-01

## 2023-01-01 RX ORDER — LORAZEPAM 2 MG/ML
1 INJECTION INTRAMUSCULAR
Status: DISCONTINUED | OUTPATIENT
Start: 2023-01-01 | End: 2023-01-01 | Stop reason: HOSPADM

## 2023-01-01 RX ORDER — GLYCOPYRROLATE 0.2 MG/ML
0.4 INJECTION INTRAMUSCULAR; INTRAVENOUS
Status: CANCELLED | OUTPATIENT
Start: 2023-01-01

## 2023-01-01 RX ORDER — GLYCOPYRROLATE 0.2 MG/ML
0.2 INJECTION INTRAMUSCULAR; INTRAVENOUS
Status: DISCONTINUED | OUTPATIENT
Start: 2023-01-01 | End: 2023-01-01 | Stop reason: HOSPADM

## 2023-01-01 RX ORDER — LORAZEPAM 2 MG/ML
1 INJECTION INTRAMUSCULAR
Status: CANCELLED | OUTPATIENT
Start: 2023-01-01 | End: 2023-01-01

## 2023-01-01 RX ORDER — LORAZEPAM 2 MG/ML
2 CONCENTRATE ORAL
Status: CANCELLED | OUTPATIENT
Start: 2023-01-01 | End: 2023-01-01

## 2023-01-01 RX ORDER — KETOROLAC TROMETHAMINE 15 MG/ML
15 INJECTION, SOLUTION INTRAMUSCULAR; INTRAVENOUS EVERY 6 HOURS PRN
Status: DISCONTINUED | OUTPATIENT
Start: 2023-01-01 | End: 2023-01-01 | Stop reason: HOSPADM

## 2023-01-01 RX ORDER — MORPHINE SULFATE 20 MG/ML
10 SOLUTION ORAL
Status: CANCELLED | OUTPATIENT
Start: 2023-01-01 | End: 2023-01-01

## 2023-01-01 RX ORDER — NICOTINE POLACRILEX 4 MG
15 LOZENGE BUCCAL
Status: DISCONTINUED | OUTPATIENT
Start: 2023-01-01 | End: 2023-01-01

## 2023-01-01 RX ORDER — ACETAMINOPHEN 650 MG/1
650 SUPPOSITORY RECTAL EVERY 4 HOURS PRN
Status: DISCONTINUED | OUTPATIENT
Start: 2023-01-01 | End: 2023-01-01 | Stop reason: HOSPADM

## 2023-01-01 RX ORDER — DIPHENHYDRAMINE HCL 25 MG
25 CAPSULE ORAL EVERY 6 HOURS PRN
Status: DISCONTINUED | OUTPATIENT
Start: 2023-01-01 | End: 2023-01-01 | Stop reason: HOSPADM

## 2023-01-01 RX ORDER — LORAZEPAM 2 MG/ML
0.5 CONCENTRATE ORAL
Status: DISCONTINUED | OUTPATIENT
Start: 2023-01-01 | End: 2023-01-01 | Stop reason: HOSPADM

## 2023-01-01 RX ORDER — LORAZEPAM 2 MG/ML
2 INJECTION INTRAMUSCULAR
Status: CANCELLED | OUTPATIENT
Start: 2023-01-01 | End: 2023-01-01

## 2023-01-01 RX ORDER — DEXTROSE MONOHYDRATE 25 G/50ML
25 INJECTION, SOLUTION INTRAVENOUS
Status: DISCONTINUED | OUTPATIENT
Start: 2023-01-01 | End: 2023-01-01

## 2023-01-01 RX ORDER — ASPIRIN 300 MG/1
300 SUPPOSITORY RECTAL DAILY
Status: DISCONTINUED | OUTPATIENT
Start: 2023-01-01 | End: 2023-01-01

## 2023-01-01 RX ORDER — MORPHINE SULFATE 20 MG/ML
5 SOLUTION ORAL
Status: DISCONTINUED | OUTPATIENT
Start: 2023-01-01 | End: 2023-01-01 | Stop reason: HOSPADM

## 2023-01-01 RX ORDER — GLYCOPYRROLATE 0.2 MG/ML
0.4 INJECTION INTRAMUSCULAR; INTRAVENOUS
Status: DISCONTINUED | OUTPATIENT
Start: 2023-01-01 | End: 2023-01-01 | Stop reason: HOSPADM

## 2023-01-01 RX ORDER — ACETAMINOPHEN 325 MG/1
650 TABLET ORAL EVERY 4 HOURS PRN
Status: DISCONTINUED | OUTPATIENT
Start: 2023-01-01 | End: 2023-01-01 | Stop reason: HOSPADM

## 2023-01-01 RX ORDER — LORAZEPAM 2 MG/ML
1 CONCENTRATE ORAL
Status: DISCONTINUED | OUTPATIENT
Start: 2023-01-01 | End: 2023-01-01 | Stop reason: HOSPADM

## 2023-01-01 RX ORDER — KETOROLAC TROMETHAMINE 15 MG/ML
15 INJECTION, SOLUTION INTRAMUSCULAR; INTRAVENOUS EVERY 6 HOURS PRN
Status: CANCELLED | OUTPATIENT
Start: 2023-01-01 | End: 2023-01-01

## 2023-01-01 RX ORDER — MORPHINE SULFATE 2 MG/ML
1 INJECTION, SOLUTION INTRAMUSCULAR; INTRAVENOUS EVERY 4 HOURS PRN
Status: DISCONTINUED | OUTPATIENT
Start: 2023-01-01 | End: 2023-01-01

## 2023-01-01 RX ORDER — MORPHINE SULFATE 2 MG/ML
2 INJECTION, SOLUTION INTRAMUSCULAR; INTRAVENOUS
Status: CANCELLED | OUTPATIENT
Start: 2023-01-01 | End: 2023-01-01

## 2023-01-01 RX ORDER — HYDROMORPHONE HYDROCHLORIDE 1 MG/ML
0.5 INJECTION, SOLUTION INTRAMUSCULAR; INTRAVENOUS; SUBCUTANEOUS
Status: DISCONTINUED | OUTPATIENT
Start: 2023-01-01 | End: 2023-01-01 | Stop reason: HOSPADM

## 2023-01-01 RX ORDER — MORPHINE SULFATE 4 MG/ML
4 INJECTION, SOLUTION INTRAMUSCULAR; INTRAVENOUS
Status: CANCELLED | OUTPATIENT
Start: 2023-01-01 | End: 2023-01-01

## 2023-01-01 RX ORDER — DEXTROSE MONOHYDRATE 50 MG/ML
125 INJECTION, SOLUTION INTRAVENOUS CONTINUOUS
Status: DISCONTINUED | OUTPATIENT
Start: 2023-01-01 | End: 2023-01-01

## 2023-01-01 RX ORDER — LORAZEPAM 2 MG/ML
0.5 INJECTION INTRAMUSCULAR
Status: DISCONTINUED | OUTPATIENT
Start: 2023-01-01 | End: 2023-01-01 | Stop reason: HOSPADM

## 2023-01-01 RX ORDER — SODIUM CHLORIDE 0.9 % (FLUSH) 0.9 %
10 SYRINGE (ML) INJECTION AS NEEDED
Status: DISCONTINUED | OUTPATIENT
Start: 2023-01-01 | End: 2023-01-01 | Stop reason: HOSPADM

## 2023-01-01 RX ORDER — SODIUM CHLORIDE, SODIUM LACTATE, POTASSIUM CHLORIDE, CALCIUM CHLORIDE 600; 310; 30; 20 MG/100ML; MG/100ML; MG/100ML; MG/100ML
50 INJECTION, SOLUTION INTRAVENOUS CONTINUOUS
Status: DISCONTINUED | OUTPATIENT
Start: 2023-01-01 | End: 2023-01-01

## 2023-01-01 RX ORDER — ONDANSETRON 4 MG/1
4 TABLET, FILM COATED ORAL EVERY 6 HOURS PRN
Status: CANCELLED | OUTPATIENT
Start: 2023-01-01

## 2023-01-01 RX ORDER — DIPHENHYDRAMINE HYDROCHLORIDE 50 MG/ML
25 INJECTION INTRAMUSCULAR; INTRAVENOUS EVERY 6 HOURS PRN
Status: DISCONTINUED | OUTPATIENT
Start: 2023-01-01 | End: 2023-01-01 | Stop reason: HOSPADM

## 2023-01-01 RX ORDER — DIPHENOXYLATE HYDROCHLORIDE AND ATROPINE SULFATE 2.5; .025 MG/1; MG/1
1 TABLET ORAL
Status: CANCELLED | OUTPATIENT
Start: 2023-01-01

## 2023-01-01 RX ORDER — ONDANSETRON 4 MG/1
4 TABLET, FILM COATED ORAL EVERY 6 HOURS PRN
Status: DISCONTINUED | OUTPATIENT
Start: 2023-01-01 | End: 2023-01-01 | Stop reason: HOSPADM

## 2023-01-01 RX ORDER — LORAZEPAM 2 MG/ML
1 CONCENTRATE ORAL
Status: CANCELLED | OUTPATIENT
Start: 2023-01-01 | End: 2023-01-01

## 2023-01-01 RX ORDER — INSULIN LISPRO 100 [IU]/ML
2-7 INJECTION, SOLUTION INTRAVENOUS; SUBCUTANEOUS
Status: DISCONTINUED | OUTPATIENT
Start: 2023-01-01 | End: 2023-01-01

## 2023-01-01 RX ORDER — DIPHENOXYLATE HYDROCHLORIDE AND ATROPINE SULFATE 2.5; .025 MG/1; MG/1
1 TABLET ORAL
Status: DISCONTINUED | OUTPATIENT
Start: 2023-01-01 | End: 2023-01-01 | Stop reason: HOSPADM

## 2023-01-01 RX ORDER — HYDROMORPHONE HYDROCHLORIDE 1 MG/ML
0.5 INJECTION, SOLUTION INTRAMUSCULAR; INTRAVENOUS; SUBCUTANEOUS
Status: DISPENSED | OUTPATIENT
Start: 2023-01-01 | End: 2023-01-01

## 2023-01-01 RX ORDER — ACETAMINOPHEN 160 MG/5ML
650 SOLUTION ORAL EVERY 4 HOURS PRN
Status: CANCELLED | OUTPATIENT
Start: 2023-01-01

## 2023-01-01 RX ORDER — HYDROMORPHONE HYDROCHLORIDE 1 MG/ML
0.5 INJECTION, SOLUTION INTRAMUSCULAR; INTRAVENOUS; SUBCUTANEOUS
Status: CANCELLED | OUTPATIENT
Start: 2023-01-01 | End: 2023-01-01

## 2023-01-01 RX ORDER — SODIUM CHLORIDE 0.9 % (FLUSH) 0.9 %
10 SYRINGE (ML) INJECTION AS NEEDED
Status: CANCELLED | OUTPATIENT
Start: 2023-01-01

## 2023-01-01 RX ORDER — ACETAMINOPHEN 650 MG/1
650 SUPPOSITORY RECTAL EVERY 4 HOURS PRN
Status: CANCELLED | OUTPATIENT
Start: 2023-01-01

## 2023-01-01 RX ORDER — MORPHINE SULFATE 20 MG/ML
10 SOLUTION ORAL
Status: DISCONTINUED | OUTPATIENT
Start: 2023-01-01 | End: 2023-01-01 | Stop reason: HOSPADM

## 2023-01-01 RX ORDER — LIDOCAINE HYDROCHLORIDE 20 MG/ML
5 SOLUTION OROPHARYNGEAL EVERY 4 HOURS PRN
Status: CANCELLED | OUTPATIENT
Start: 2023-01-01

## 2023-01-01 RX ORDER — POTASSIUM CHLORIDE 7.45 MG/ML
10 INJECTION INTRAVENOUS
Status: DISCONTINUED | OUTPATIENT
Start: 2023-01-01 | End: 2023-01-01

## 2023-01-01 RX ORDER — MORPHINE SULFATE 2 MG/ML
2 INJECTION, SOLUTION INTRAMUSCULAR; INTRAVENOUS
Status: DISCONTINUED | OUTPATIENT
Start: 2023-01-01 | End: 2023-01-01 | Stop reason: HOSPADM

## 2023-01-01 RX ORDER — POTASSIUM CHLORIDE 7.45 MG/ML
10 INJECTION INTRAVENOUS
Status: CANCELLED | OUTPATIENT
Start: 2023-01-01 | End: 2023-01-01

## 2023-01-01 RX ORDER — DIPHENHYDRAMINE HYDROCHLORIDE 50 MG/ML
25 INJECTION INTRAMUSCULAR; INTRAVENOUS EVERY 6 HOURS PRN
Status: CANCELLED | OUTPATIENT
Start: 2023-01-01

## 2023-01-01 RX ORDER — MORPHINE SULFATE 20 MG/ML
5 SOLUTION ORAL
Status: CANCELLED | OUTPATIENT
Start: 2023-01-01 | End: 2023-01-01

## 2023-01-01 RX ORDER — ACETAMINOPHEN 325 MG/1
650 TABLET ORAL EVERY 4 HOURS PRN
Status: CANCELLED | OUTPATIENT
Start: 2023-01-01

## 2023-01-01 RX ORDER — POTASSIUM CHLORIDE 7.45 MG/ML
10 INJECTION INTRAVENOUS
Status: COMPLETED | OUTPATIENT
Start: 2023-01-01 | End: 2023-01-01

## 2023-01-01 RX ORDER — GLYCOPYRROLATE 0.2 MG/ML
0.2 INJECTION INTRAMUSCULAR; INTRAVENOUS
Status: CANCELLED | OUTPATIENT
Start: 2023-01-01

## 2023-01-01 RX ORDER — MORPHINE SULFATE 2 MG/ML
2 INJECTION, SOLUTION INTRAMUSCULAR; INTRAVENOUS EVERY 4 HOURS PRN
Status: DISCONTINUED | OUTPATIENT
Start: 2023-01-01 | End: 2023-01-01

## 2023-01-01 RX ORDER — DIPHENHYDRAMINE HCL 25 MG
25 CAPSULE ORAL EVERY 6 HOURS PRN
Status: CANCELLED | OUTPATIENT
Start: 2023-01-01

## 2023-01-01 RX ORDER — ACETAMINOPHEN 650 MG/1
650 SUPPOSITORY RECTAL EVERY 6 HOURS PRN
Status: DISCONTINUED | OUTPATIENT
Start: 2023-01-01 | End: 2023-01-01

## 2023-01-01 RX ADMIN — PIPERACILLIN SODIUM AND TAZOBACTAM SODIUM 3.38 G: 3; .375 INJECTION, SOLUTION INTRAVENOUS at 18:53

## 2023-01-01 RX ADMIN — HYDROMORPHONE HYDROCHLORIDE 0.5 MG: 1 INJECTION, SOLUTION INTRAMUSCULAR; INTRAVENOUS; SUBCUTANEOUS at 09:03

## 2023-01-01 RX ADMIN — LORAZEPAM 1 MG: 2 INJECTION INTRAMUSCULAR; INTRAVENOUS at 16:45

## 2023-01-01 RX ADMIN — POTASSIUM CHLORIDE 10 MEQ: 7.46 INJECTION, SOLUTION INTRAVENOUS at 14:18

## 2023-01-01 RX ADMIN — CEFEPIME 2000 MG: 2 INJECTION, POWDER, FOR SOLUTION INTRAVENOUS at 15:01

## 2023-01-01 RX ADMIN — GLYCOPYRROLATE 0.4 MG: 0.2 INJECTION INTRAMUSCULAR; INTRAVENOUS at 09:10

## 2023-01-01 RX ADMIN — POTASSIUM CHLORIDE 10 MEQ: 7.46 INJECTION, SOLUTION INTRAVENOUS at 07:45

## 2023-01-01 RX ADMIN — HYDROMORPHONE HYDROCHLORIDE 0.5 MG: 1 INJECTION, SOLUTION INTRAMUSCULAR; INTRAVENOUS; SUBCUTANEOUS at 04:59

## 2023-01-01 RX ADMIN — SODIUM CHLORIDE 500 ML: 9 INJECTION, SOLUTION INTRAVENOUS at 23:38

## 2023-01-01 RX ADMIN — GLYCOPYRROLATE 0.4 MG: 0.2 INJECTION INTRAMUSCULAR; INTRAVENOUS at 20:46

## 2023-01-01 RX ADMIN — LORAZEPAM 0.5 MG: 2 INJECTION INTRAMUSCULAR; INTRAVENOUS at 17:30

## 2023-01-01 RX ADMIN — HYDROMORPHONE HYDROCHLORIDE 0.5 MG: 1 INJECTION, SOLUTION INTRAMUSCULAR; INTRAVENOUS; SUBCUTANEOUS at 12:56

## 2023-01-01 RX ADMIN — LORAZEPAM 1 MG: 2 INJECTION INTRAMUSCULAR; INTRAVENOUS at 12:20

## 2023-01-01 RX ADMIN — HYDROMORPHONE HYDROCHLORIDE 0.5 MG: 1 INJECTION, SOLUTION INTRAMUSCULAR; INTRAVENOUS; SUBCUTANEOUS at 08:55

## 2023-01-01 RX ADMIN — DEXTROSE MONOHYDRATE 100 ML/HR: 50 INJECTION, SOLUTION INTRAVENOUS at 02:51

## 2023-01-01 RX ADMIN — GLYCOPYRROLATE 0.4 MG: 0.2 INJECTION INTRAMUSCULAR; INTRAVENOUS at 21:13

## 2023-01-01 RX ADMIN — LORAZEPAM 1 MG: 2 INJECTION INTRAMUSCULAR; INTRAVENOUS at 00:32

## 2023-01-01 RX ADMIN — HYDROMORPHONE HYDROCHLORIDE 0.5 MG: 1 INJECTION, SOLUTION INTRAMUSCULAR; INTRAVENOUS; SUBCUTANEOUS at 20:31

## 2023-01-01 RX ADMIN — HYDROMORPHONE HYDROCHLORIDE 0.5 MG: 1 INJECTION, SOLUTION INTRAMUSCULAR; INTRAVENOUS; SUBCUTANEOUS at 04:41

## 2023-01-01 RX ADMIN — CEFEPIME 2000 MG: 2 INJECTION, POWDER, FOR SOLUTION INTRAVENOUS at 03:46

## 2023-01-01 RX ADMIN — CEFEPIME 2000 MG: 2 INJECTION, POWDER, FOR SOLUTION INTRAVENOUS at 20:48

## 2023-01-01 RX ADMIN — POTASSIUM CHLORIDE 10 MEQ: 7.46 INJECTION, SOLUTION INTRAVENOUS at 03:52

## 2023-01-01 RX ADMIN — LORAZEPAM 1 MG: 2 INJECTION INTRAMUSCULAR; INTRAVENOUS at 16:31

## 2023-01-01 RX ADMIN — CEFEPIME 2000 MG: 2 INJECTION, POWDER, FOR SOLUTION INTRAVENOUS at 16:12

## 2023-01-01 RX ADMIN — GLYCOPYRROLATE 0.4 MG: 0.2 INJECTION INTRAMUSCULAR; INTRAVENOUS at 12:33

## 2023-01-01 RX ADMIN — SODIUM CHLORIDE 500 ML: 9 INJECTION, SOLUTION INTRAVENOUS at 22:07

## 2023-01-01 RX ADMIN — ASPIRIN 300 MG: 300 SUPPOSITORY RECTAL at 09:44

## 2023-01-01 RX ADMIN — ASPIRIN 300 MG: 300 SUPPOSITORY RECTAL at 08:52

## 2023-01-01 RX ADMIN — GLYCOPYRROLATE 0.2 MG: 0.2 INJECTION INTRAMUSCULAR; INTRAVENOUS at 04:51

## 2023-01-01 RX ADMIN — GLYCOPYRROLATE 0.4 MG: 0.2 INJECTION INTRAMUSCULAR; INTRAVENOUS at 20:43

## 2023-01-01 RX ADMIN — DEXTROSE MONOHYDRATE 125 ML/HR: 50 INJECTION, SOLUTION INTRAVENOUS at 11:47

## 2023-01-01 RX ADMIN — GLYCOPYRROLATE 0.4 MG: 0.2 INJECTION INTRAMUSCULAR; INTRAVENOUS at 12:20

## 2023-01-01 RX ADMIN — LORAZEPAM 1 MG: 2 INJECTION INTRAMUSCULAR; INTRAVENOUS at 00:44

## 2023-01-01 RX ADMIN — DEXTROSE MONOHYDRATE 50 ML/HR: 50 INJECTION, SOLUTION INTRAVENOUS at 18:51

## 2023-01-01 RX ADMIN — GLYCOPYRROLATE 0.4 MG: 0.2 INJECTION INTRAMUSCULAR; INTRAVENOUS at 00:32

## 2023-01-01 RX ADMIN — LORAZEPAM 1 MG: 2 INJECTION INTRAMUSCULAR; INTRAVENOUS at 08:44

## 2023-01-01 RX ADMIN — HYDROMORPHONE HYDROCHLORIDE 0.5 MG: 1 INJECTION, SOLUTION INTRAMUSCULAR; INTRAVENOUS; SUBCUTANEOUS at 13:05

## 2023-01-01 RX ADMIN — HYDROMORPHONE HYDROCHLORIDE 1 MG: 1 INJECTION, SOLUTION INTRAMUSCULAR; INTRAVENOUS; SUBCUTANEOUS at 00:54

## 2023-01-01 RX ADMIN — POTASSIUM CHLORIDE 10 MEQ: 7.46 INJECTION, SOLUTION INTRAVENOUS at 13:26

## 2023-01-01 RX ADMIN — HYDROMORPHONE HYDROCHLORIDE 0.5 MG: 1 INJECTION, SOLUTION INTRAMUSCULAR; INTRAVENOUS; SUBCUTANEOUS at 09:10

## 2023-01-01 RX ADMIN — GLYCOPYRROLATE 0.4 MG: 0.2 INJECTION INTRAMUSCULAR; INTRAVENOUS at 16:37

## 2023-01-01 RX ADMIN — SODIUM CHLORIDE 500 ML: 9 INJECTION, SOLUTION INTRAVENOUS at 15:00

## 2023-01-01 RX ADMIN — LORAZEPAM 1 MG: 2 INJECTION INTRAMUSCULAR; INTRAVENOUS at 13:06

## 2023-01-01 RX ADMIN — HYDROMORPHONE HYDROCHLORIDE 0.5 MG: 1 INJECTION, SOLUTION INTRAMUSCULAR; INTRAVENOUS; SUBCUTANEOUS at 20:19

## 2023-01-01 RX ADMIN — LORAZEPAM 1 MG: 2 INJECTION INTRAMUSCULAR; INTRAVENOUS at 04:54

## 2023-01-01 RX ADMIN — MORPHINE SULFATE 4 MG: 4 INJECTION, SOLUTION INTRAMUSCULAR; INTRAVENOUS at 20:44

## 2023-01-01 RX ADMIN — LORAZEPAM 1 MG: 2 INJECTION INTRAMUSCULAR; INTRAVENOUS at 16:21

## 2023-01-01 RX ADMIN — POTASSIUM CHLORIDE 10 MEQ: 7.46 INJECTION, SOLUTION INTRAVENOUS at 12:26

## 2023-01-01 RX ADMIN — HYDROMORPHONE HYDROCHLORIDE 0.5 MG: 1 INJECTION, SOLUTION INTRAMUSCULAR; INTRAVENOUS; SUBCUTANEOUS at 08:44

## 2023-01-01 RX ADMIN — GLYCOPYRROLATE 0.4 MG: 0.2 INJECTION INTRAMUSCULAR; INTRAVENOUS at 16:20

## 2023-01-01 RX ADMIN — GLYCOPYRROLATE 0.4 MG: 0.2 INJECTION INTRAMUSCULAR; INTRAVENOUS at 08:33

## 2023-01-01 RX ADMIN — HYDROMORPHONE HYDROCHLORIDE 0.5 MG: 1 INJECTION, SOLUTION INTRAMUSCULAR; INTRAVENOUS; SUBCUTANEOUS at 01:04

## 2023-01-01 RX ADMIN — LORAZEPAM 1 MG: 2 INJECTION INTRAMUSCULAR; INTRAVENOUS at 20:49

## 2023-01-01 RX ADMIN — HYDROMORPHONE HYDROCHLORIDE 0.5 MG: 1 INJECTION, SOLUTION INTRAMUSCULAR; INTRAVENOUS; SUBCUTANEOUS at 16:21

## 2023-01-01 RX ADMIN — MORPHINE SULFATE 4 MG: 4 INJECTION, SOLUTION INTRAMUSCULAR; INTRAVENOUS at 17:29

## 2023-01-01 RX ADMIN — GLYCOPYRROLATE 0.4 MG: 0.2 INJECTION INTRAMUSCULAR; INTRAVENOUS at 13:06

## 2023-01-01 RX ADMIN — LORAZEPAM 1 MG: 2 INJECTION INTRAMUSCULAR; INTRAVENOUS at 04:51

## 2023-01-01 RX ADMIN — INSULIN LISPRO 2 UNITS: 100 INJECTION, SOLUTION INTRAVENOUS; SUBCUTANEOUS at 11:36

## 2023-01-01 RX ADMIN — HYDROMORPHONE HYDROCHLORIDE 1 MG: 1 INJECTION, SOLUTION INTRAMUSCULAR; INTRAVENOUS; SUBCUTANEOUS at 16:45

## 2023-01-01 RX ADMIN — GLYCOPYRROLATE 0.4 MG: 0.2 INJECTION INTRAMUSCULAR; INTRAVENOUS at 01:00

## 2023-01-01 RX ADMIN — POTASSIUM CHLORIDE 10 MEQ: 7.46 INJECTION, SOLUTION INTRAVENOUS at 05:00

## 2023-01-01 RX ADMIN — LORAZEPAM 1 MG: 2 INJECTION INTRAMUSCULAR; INTRAVENOUS at 12:23

## 2023-01-01 RX ADMIN — LORAZEPAM 1 MG: 2 INJECTION INTRAMUSCULAR; INTRAVENOUS at 00:51

## 2023-01-01 RX ADMIN — LORAZEPAM 1 MG: 2 INJECTION INTRAMUSCULAR; INTRAVENOUS at 20:32

## 2023-01-01 RX ADMIN — LORAZEPAM 1 MG: 2 INJECTION INTRAMUSCULAR; INTRAVENOUS at 04:59

## 2023-01-01 RX ADMIN — HYDROMORPHONE HYDROCHLORIDE 0.5 MG: 1 INJECTION, SOLUTION INTRAMUSCULAR; INTRAVENOUS; SUBCUTANEOUS at 16:37

## 2023-01-01 RX ADMIN — MORPHINE SULFATE 2 MG: 2 INJECTION, SOLUTION INTRAMUSCULAR; INTRAVENOUS at 15:20

## 2023-01-01 RX ADMIN — VANCOMYCIN HYDROCHLORIDE 750 MG: 750 INJECTION, POWDER, LYOPHILIZED, FOR SOLUTION INTRAVENOUS at 22:29

## 2023-01-01 RX ADMIN — GLYCOPYRROLATE 0.4 MG: 0.2 INJECTION INTRAMUSCULAR; INTRAVENOUS at 09:03

## 2023-01-01 RX ADMIN — LORAZEPAM 1 MG: 2 INJECTION INTRAMUSCULAR; INTRAVENOUS at 12:25

## 2023-01-01 RX ADMIN — LORAZEPAM 1 MG: 2 INJECTION INTRAMUSCULAR; INTRAVENOUS at 08:33

## 2023-01-01 RX ADMIN — LORAZEPAM 1 MG: 2 INJECTION INTRAMUSCULAR; INTRAVENOUS at 04:41

## 2023-01-01 RX ADMIN — DEXTROSE MONOHYDRATE 125 ML/HR: 50 INJECTION, SOLUTION INTRAVENOUS at 14:17

## 2023-01-01 RX ADMIN — ASPIRIN 300 MG: 300 SUPPOSITORY RECTAL at 00:29

## 2023-01-01 RX ADMIN — POTASSIUM CHLORIDE 10 MEQ: 7.46 INJECTION, SOLUTION INTRAVENOUS at 06:05

## 2023-01-01 RX ADMIN — POTASSIUM CHLORIDE 10 MEQ: 7.46 INJECTION, SOLUTION INTRAVENOUS at 11:31

## 2023-01-01 RX ADMIN — HYDROMORPHONE HYDROCHLORIDE 1 MG: 1 INJECTION, SOLUTION INTRAMUSCULAR; INTRAVENOUS; SUBCUTANEOUS at 00:44

## 2023-01-01 RX ADMIN — LORAZEPAM 1 MG: 2 INJECTION INTRAMUSCULAR; INTRAVENOUS at 20:19

## 2023-01-01 RX ADMIN — GLYCOPYRROLATE 0.4 MG: 0.2 INJECTION INTRAMUSCULAR; INTRAVENOUS at 01:04

## 2023-01-01 RX ADMIN — HYDROMORPHONE HYDROCHLORIDE 0.5 MG: 1 INJECTION, SOLUTION INTRAMUSCULAR; INTRAVENOUS; SUBCUTANEOUS at 20:46

## 2023-01-01 RX ADMIN — HYDROMORPHONE HYDROCHLORIDE 0.5 MG: 1 INJECTION, SOLUTION INTRAMUSCULAR; INTRAVENOUS; SUBCUTANEOUS at 04:53

## 2023-01-01 RX ADMIN — LORAZEPAM 1 MG: 2 INJECTION INTRAMUSCULAR; INTRAVENOUS at 08:54

## 2023-01-01 RX ADMIN — LORAZEPAM 1 MG: 2 INJECTION INTRAMUSCULAR; INTRAVENOUS at 20:47

## 2023-01-01 RX ADMIN — HYDROMORPHONE HYDROCHLORIDE 0.5 MG: 1 INJECTION, SOLUTION INTRAMUSCULAR; INTRAVENOUS; SUBCUTANEOUS at 12:25

## 2023-01-01 RX ADMIN — GLYCOPYRROLATE 0.4 MG: 0.2 INJECTION INTRAMUSCULAR; INTRAVENOUS at 16:21

## 2023-01-01 RX ADMIN — LORAZEPAM 0.5 MG: 2 INJECTION INTRAMUSCULAR; INTRAVENOUS at 20:44

## 2023-01-01 RX ADMIN — LORAZEPAM 1 MG: 2 INJECTION INTRAMUSCULAR; INTRAVENOUS at 04:35

## 2023-01-01 RX ADMIN — GLYCOPYRROLATE 0.4 MG: 0.2 INJECTION INTRAMUSCULAR; INTRAVENOUS at 04:16

## 2023-01-01 RX ADMIN — LORAZEPAM 1 MG: 2 INJECTION INTRAMUSCULAR; INTRAVENOUS at 12:56

## 2023-01-01 RX ADMIN — ASPIRIN 300 MG: 300 SUPPOSITORY RECTAL at 08:41

## 2023-01-01 RX ADMIN — LORAZEPAM 2 MG: 2 INJECTION INTRAMUSCULAR; INTRAVENOUS at 21:14

## 2023-01-01 RX ADMIN — MORPHINE SULFATE 4 MG: 4 INJECTION, SOLUTION INTRAMUSCULAR; INTRAVENOUS at 04:51

## 2023-01-01 RX ADMIN — LORAZEPAM 1 MG: 2 INJECTION INTRAMUSCULAR; INTRAVENOUS at 01:04

## 2023-01-01 RX ADMIN — LORAZEPAM 1 MG: 2 INJECTION INTRAMUSCULAR; INTRAVENOUS at 08:42

## 2023-01-01 RX ADMIN — HYDROMORPHONE HYDROCHLORIDE 0.5 MG: 1 INJECTION, SOLUTION INTRAMUSCULAR; INTRAVENOUS; SUBCUTANEOUS at 16:31

## 2023-01-01 RX ADMIN — GLYCOPYRROLATE 0.4 MG: 0.2 INJECTION INTRAMUSCULAR; INTRAVENOUS at 12:25

## 2023-01-01 RX ADMIN — GLYCOPYRROLATE 0.4 MG: 0.2 INJECTION INTRAMUSCULAR; INTRAVENOUS at 16:31

## 2023-01-01 RX ADMIN — LORAZEPAM 1 MG: 2 INJECTION INTRAMUSCULAR; INTRAVENOUS at 17:09

## 2023-01-01 RX ADMIN — POTASSIUM CHLORIDE 10 MEQ: 7.46 INJECTION, SOLUTION INTRAVENOUS at 02:26

## 2023-01-01 RX ADMIN — INSULIN LISPRO 2 UNITS: 100 INJECTION, SOLUTION INTRAVENOUS; SUBCUTANEOUS at 20:51

## 2023-01-01 RX ADMIN — DEXTROSE MONOHYDRATE 125 ML/HR: 50 INJECTION, SOLUTION INTRAVENOUS at 21:05

## 2023-01-01 RX ADMIN — HYDROMORPHONE HYDROCHLORIDE 0.5 MG: 1 INJECTION, SOLUTION INTRAMUSCULAR; INTRAVENOUS; SUBCUTANEOUS at 01:00

## 2023-01-01 RX ADMIN — HYDROMORPHONE HYDROCHLORIDE 0.5 MG: 1 INJECTION, SOLUTION INTRAMUSCULAR; INTRAVENOUS; SUBCUTANEOUS at 16:20

## 2023-01-01 RX ADMIN — DEXTROSE MONOHYDRATE 125 ML/HR: 50 INJECTION, SOLUTION INTRAVENOUS at 05:16

## 2023-01-01 RX ADMIN — LORAZEPAM 1 MG: 2 INJECTION INTRAMUSCULAR; INTRAVENOUS at 09:10

## 2023-01-01 RX ADMIN — LORAZEPAM 1 MG: 2 INJECTION INTRAMUSCULAR; INTRAVENOUS at 16:37

## 2023-01-01 RX ADMIN — MORPHINE SULFATE 1 MG: 2 INJECTION, SOLUTION INTRAMUSCULAR; INTRAVENOUS at 03:43

## 2023-01-01 RX ADMIN — CEFEPIME 2000 MG: 2 INJECTION, POWDER, FOR SOLUTION INTRAVENOUS at 03:02

## 2023-01-01 RX ADMIN — INSULIN LISPRO 2 UNITS: 100 INJECTION, SOLUTION INTRAVENOUS; SUBCUTANEOUS at 23:39

## 2023-01-01 RX ADMIN — HYDROMORPHONE HYDROCHLORIDE 1 MG: 1 INJECTION, SOLUTION INTRAMUSCULAR; INTRAVENOUS; SUBCUTANEOUS at 12:23

## 2023-01-01 RX ADMIN — HYDROMORPHONE HYDROCHLORIDE 0.5 MG: 1 INJECTION, SOLUTION INTRAMUSCULAR; INTRAVENOUS; SUBCUTANEOUS at 12:20

## 2023-01-01 RX ADMIN — LORAZEPAM 1 MG: 2 INJECTION INTRAMUSCULAR; INTRAVENOUS at 09:03

## 2023-01-01 RX ADMIN — HYDROMORPHONE HYDROCHLORIDE 0.5 MG: 1 INJECTION, SOLUTION INTRAMUSCULAR; INTRAVENOUS; SUBCUTANEOUS at 00:32

## 2023-01-01 RX ADMIN — HYDROMORPHONE HYDROCHLORIDE 1 MG: 1 INJECTION, SOLUTION INTRAMUSCULAR; INTRAVENOUS; SUBCUTANEOUS at 08:42

## 2023-01-01 RX ADMIN — LORAZEPAM 1 MG: 2 INJECTION INTRAMUSCULAR; INTRAVENOUS at 20:43

## 2023-01-01 RX ADMIN — HYDROMORPHONE HYDROCHLORIDE 0.5 MG: 1 INJECTION, SOLUTION INTRAMUSCULAR; INTRAVENOUS; SUBCUTANEOUS at 17:10

## 2023-01-01 RX ADMIN — LORAZEPAM 1 MG: 2 INJECTION INTRAMUSCULAR; INTRAVENOUS at 01:00

## 2023-01-01 RX ADMIN — GLYCOPYRROLATE 0.4 MG: 0.2 INJECTION INTRAMUSCULAR; INTRAVENOUS at 12:23

## 2023-01-01 RX ADMIN — GLYCOPYRROLATE 0.4 MG: 0.2 INJECTION INTRAMUSCULAR; INTRAVENOUS at 17:09

## 2023-01-01 RX ADMIN — HYDROMORPHONE HYDROCHLORIDE 0.5 MG: 1 INJECTION, SOLUTION INTRAMUSCULAR; INTRAVENOUS; SUBCUTANEOUS at 04:17

## 2023-01-01 RX ADMIN — GLYCOPYRROLATE 0.4 MG: 0.2 INJECTION INTRAMUSCULAR; INTRAVENOUS at 00:44

## 2023-01-01 RX ADMIN — POTASSIUM CHLORIDE 10 MEQ: 7.46 INJECTION, SOLUTION INTRAVENOUS at 07:28

## 2023-01-01 RX ADMIN — INSULIN LISPRO 3 UNITS: 100 INJECTION, SOLUTION INTRAVENOUS; SUBCUTANEOUS at 08:41

## 2023-01-01 RX ADMIN — LORAZEPAM 1 MG: 2 INJECTION INTRAMUSCULAR; INTRAVENOUS at 12:33

## 2023-01-01 RX ADMIN — GLYCOPYRROLATE 0.4 MG: 0.2 INJECTION INTRAMUSCULAR; INTRAVENOUS at 04:53

## 2023-01-01 RX ADMIN — LORAZEPAM 1 MG: 2 INJECTION INTRAMUSCULAR; INTRAVENOUS at 04:17

## 2023-01-01 RX ADMIN — HYDROMORPHONE HYDROCHLORIDE 1 MG: 1 INJECTION, SOLUTION INTRAMUSCULAR; INTRAVENOUS; SUBCUTANEOUS at 04:35

## 2023-01-01 RX ADMIN — GLYCOPYRROLATE 0.4 MG: 0.2 INJECTION INTRAMUSCULAR; INTRAVENOUS at 12:55

## 2023-01-01 RX ADMIN — GLYCOPYRROLATE 0.4 MG: 0.2 INJECTION INTRAMUSCULAR; INTRAVENOUS at 08:54

## 2023-01-01 RX ADMIN — HYDROMORPHONE HYDROCHLORIDE 0.5 MG: 1 INJECTION, SOLUTION INTRAMUSCULAR; INTRAVENOUS; SUBCUTANEOUS at 12:33

## 2023-01-01 RX ADMIN — GLYCOPYRROLATE 0.4 MG: 0.2 INJECTION INTRAMUSCULAR; INTRAVENOUS at 08:42

## 2023-01-01 RX ADMIN — GLYCOPYRROLATE 0.4 MG: 0.2 INJECTION INTRAMUSCULAR; INTRAVENOUS at 04:36

## 2023-01-01 RX ADMIN — GLYCOPYRROLATE 0.4 MG: 0.2 INJECTION INTRAMUSCULAR; INTRAVENOUS at 20:31

## 2023-01-01 RX ADMIN — CEFEPIME 2000 MG: 2 INJECTION, POWDER, FOR SOLUTION INTRAVENOUS at 03:57

## 2023-01-01 RX ADMIN — GLYCOPYRROLATE 0.4 MG: 0.2 INJECTION INTRAMUSCULAR; INTRAVENOUS at 00:54

## 2023-01-01 RX ADMIN — MORPHINE SULFATE 1 MG: 2 INJECTION, SOLUTION INTRAMUSCULAR; INTRAVENOUS at 23:39

## 2023-01-01 RX ADMIN — GLYCOPYRROLATE 0.4 MG: 0.2 INJECTION INTRAMUSCULAR; INTRAVENOUS at 20:49

## 2023-01-01 RX ADMIN — SODIUM CHLORIDE 500 ML: 9 INJECTION, SOLUTION INTRAVENOUS at 02:26

## 2023-01-01 RX ADMIN — HYDROMORPHONE HYDROCHLORIDE 0.5 MG: 1 INJECTION, SOLUTION INTRAMUSCULAR; INTRAVENOUS; SUBCUTANEOUS at 08:33

## 2023-01-01 RX ADMIN — GLYCOPYRROLATE 0.4 MG: 0.2 INJECTION INTRAMUSCULAR; INTRAVENOUS at 04:41

## 2023-01-01 RX ADMIN — LORAZEPAM 1 MG: 2 INJECTION INTRAMUSCULAR; INTRAVENOUS at 16:20

## 2023-01-01 RX ADMIN — HYDROMORPHONE HYDROCHLORIDE 1 MG: 1 INJECTION, SOLUTION INTRAMUSCULAR; INTRAVENOUS; SUBCUTANEOUS at 20:43

## 2023-01-01 RX ADMIN — MORPHINE SULFATE 10 MG: 100 SOLUTION ORAL at 20:51

## 2023-01-01 RX ADMIN — MORPHINE SULFATE 1 MG: 2 INJECTION, SOLUTION INTRAMUSCULAR; INTRAVENOUS at 03:44

## 2023-01-01 RX ADMIN — DEXTROSE MONOHYDRATE 125 ML/HR: 50 INJECTION, SOLUTION INTRAVENOUS at 12:18

## 2023-01-01 RX ADMIN — GLYCOPYRROLATE 0.4 MG: 0.2 INJECTION INTRAMUSCULAR; INTRAVENOUS at 04:59

## 2023-01-01 RX ADMIN — MORPHINE SULFATE 4 MG: 4 INJECTION, SOLUTION INTRAMUSCULAR; INTRAVENOUS at 00:51

## 2023-01-01 RX ADMIN — GLYCOPYRROLATE 0.4 MG: 0.2 INJECTION INTRAMUSCULAR; INTRAVENOUS at 08:45

## 2023-01-01 RX ADMIN — Medication 10 ML: at 08:52

## 2023-01-01 RX ADMIN — ACETAMINOPHEN 650 MG: 650 SUPPOSITORY RECTAL at 12:35

## 2023-01-01 RX ADMIN — HYDROMORPHONE HYDROCHLORIDE 0.5 MG: 1 INJECTION, SOLUTION INTRAMUSCULAR; INTRAVENOUS; SUBCUTANEOUS at 20:50

## 2023-06-02 ENCOUNTER — TRANSCRIBE ORDERS (OUTPATIENT)
Dept: ADMINISTRATIVE | Facility: HOSPITAL | Age: 73
End: 2023-06-02
Payer: MEDICARE

## 2023-06-02 DIAGNOSIS — R16.1 SPLEEN ENLARGED: Primary | ICD-10-CM

## 2023-06-16 ENCOUNTER — HOSPITAL ENCOUNTER (OUTPATIENT)
Dept: CT IMAGING | Facility: HOSPITAL | Age: 73
Discharge: HOME | End: 2023-06-16
Payer: MEDICARE

## 2023-06-16 DIAGNOSIS — R16.1 SPLEEN ENLARGED: ICD-10-CM

## 2023-06-16 PROCEDURE — 74160 CT ABDOMEN W/CONTRAST: CPT

## 2023-06-16 PROCEDURE — 25510000001 IOPAMIDOL 61 % SOLUTION: Performed by: INTERNAL MEDICINE

## 2023-06-16 RX ADMIN — IOPAMIDOL 100 ML: 612 INJECTION, SOLUTION INTRAVENOUS at 14:48

## 2023-06-19 LAB — CREAT BLDA-MCNC: 0.4 MG/DL (ref 0.6–1.3)

## 2023-07-13 NOTE — TELEPHONE ENCOUNTER
Caller: MULU    Relationship: REHAB FACILITY    Best call back number: [phone]    What is the best time to reach you: ANY    Who are you requesting to speak with (clinical staff, provider,  specific staff member): SCHEDULING        What was the call regarding: [facility] TO SCHEDULE AN APPT FOR AMARIL[facility] WITH DR MARGARET GARZA.    Is it okay if the provider responds through MyChart: NO

## 2023-10-03 ENCOUNTER — HOSPITAL ENCOUNTER (OUTPATIENT)
Dept: CT IMAGING | Facility: HOSPITAL | Age: 73
End: 2023-10-03
Payer: MEDICARE

## 2023-10-13 ENCOUNTER — TELEPHONE (OUTPATIENT)
Dept: ONCOLOGY | Facility: CLINIC | Age: 73
End: 2023-10-13
Payer: MEDICARE

## 2023-10-13 NOTE — TELEPHONE ENCOUNTER
I spoke with Barbara Roger with [facility] guardianship office. She will contact the guardian for Ms. Sands and have her call me regarding her CT scans. Pt has not been reachable.

## 2023-10-17 ENCOUNTER — TELEPHONE (OUTPATIENT)
Dept: ONCOLOGY | Facility: CLINIC | Age: 73
End: 2023-10-17
Payer: MEDICARE

## 2023-10-17 PROBLEM — E87.0 HYPERNATREMIA: Status: ACTIVE | Noted: 2023-01-01

## 2023-10-17 NOTE — CASE MANAGEMENT/SOCIAL WORK
Legal guardianship paperwork received and given to registration for scanning. Gayathri Hancock RN

## 2023-10-17 NOTE — ED NOTES
Nursing report ED to floor  Gloria Sands  73 y.o.  female    HPI :   Chief Complaint   Patient presents with    Abnormal Lab       Admitting doctor:   Jai Monroy MD    Admitting diagnosis:   The primary encounter diagnosis was Hypernatremia. Diagnoses of Failure to thrive in adult, Decreased oral intake, Pneumonia due to infectious organism, unspecified laterality, unspecified part of lung, Hypoxia, and Thrombocytopenia were also pertinent to this visit.    Code status:   Current Code Status       Date Active Code Status Order ID Comments User Context       Not on file            Allergies:   Meperidine hcl and Sumycin [tetracycline]    Isolation:   No active isolations    Intake and Output  No intake or output data in the 24 hours ending 10/17/23 1727    Weight:       10/17/23  1651   Weight: 40.5 kg (89 lb 4.8 oz)       Most recent vitals:   Vitals:    10/17/23 1526 10/17/23 1616 10/17/23 1648 10/17/23 1651   BP: 131/89  117/59    Pulse: 89  82    Resp: 14 24     Temp: 96.7 °F (35.9 °C)      SpO2: 90%  98%    Weight:    40.5 kg (89 lb 4.8 oz)       Active LDAs/IV Access:   Lines, Drains & Airways       Active LDAs       None                    Labs (abnormal labs have a star):   Labs Reviewed   BASIC METABOLIC PANEL - Abnormal; Notable for the following components:       Result Value    Glucose 148 (*)     BUN 41 (*)     Sodium 175 (*)     Chloride 135 (*)     CO2 30.0 (*)     BUN/Creatinine Ratio 63.1 (*)     All other components within normal limits    Narrative:     GFR Normal >60  Chronic Kidney Disease <60  Kidney Failure <15    The GFR formula is only valid for adults with stable renal function between ages 18 and 70.   CBC WITH AUTO DIFFERENTIAL - Abnormal; Notable for the following components:    WBC 40.46 (*)     RBC 3.72 (*)     MCV 99.7 (*)     MCH 34.7 (*)     Platelets 47 (*)     All other components within normal limits   SINGLE HSTROPONIN T - Abnormal; Notable for the following  components:    HS Troponin T 136 (*)     All other components within normal limits    Narrative:     High Sensitive Troponin T Reference Range:  <10.0 ng/L- Negative Female for AMI  <15.0 ng/L- Negative Male for AMI  >=10 - Abnormal Female indicating possible myocardial injury.  >=15 - Abnormal Male indicating possible myocardial injury.   Clinicians would have to utilize clinical acumen, EKG, Troponin, and serial changes to determine if it is an Acute Myocardial Infarction or myocardial injury due to an underlying chronic condition.        MAGNESIUM - Abnormal; Notable for the following components:    Magnesium 3.0 (*)     All other components within normal limits   TSH - Abnormal; Notable for the following components:    TSH 4.640 (*)     All other components within normal limits   BLOOD GAS, ARTERIAL - Abnormal; Notable for the following components:    pH, Arterial 7.456 (*)     pO2, Arterial 71.6 (*)     Base Excess, Arterial 3.3 (*)     CO2 Content 28.5 (*)     All other components within normal limits   RESPIRATORY PANEL PCR W/ COVID-19 (SARS-COV-2) FABRICIO/ROGER/CAMILLA/PAD/COR/MAD/ARELY IN-HOUSE, NP [facility] IN [facility], 3-4 HR TAT   BLOOD CULTURE   BLOOD CULTURE   BLOOD GAS, ARTERIAL   URINALYSIS W/ MICROSCOPIC IF INDICATED (NO CULTURE)   MANUAL DIFFERENTIAL   LACTIC ACID, PLASMA   HIGH SENSITIVITIY TROPONIN T 2HR   BNP (IN-HOUSE)   CBC AND DIFFERENTIAL    Narrative:     The following orders were created for panel order CBC & Differential.  Procedure                               Abnormality         Status                     ---------                               -----------         ------                     CBC Auto Differential[[id number]]        Abnormal            Final result                 Please view results for these tests on the individual orders.       EKG:   ECG 12 Lead Altered Mental Status   Preliminary Result   HEART RATE= 85  bpm   RR Interval= 706  ms   HI Interval= 129  ms   P Horizontal Axis= 30  deg    P Front Axis= 47  deg   QRSD Interval= 73  ms   QT Interval= 386  ms   QTcB= 459  ms   QRS Axis= -23  deg   T Wave Axis= 245  deg   - ABNORMAL ECG -   Sinus rhythm   Abnormal R-wave progression, early transition   Inferior infarct, old   Abnrm T, consider ischemia, anterolateral lds   Electronically Signed By:    Date and Time of Study: 2023-10-17 17:05:36          Meds given in ED:   Medications   sodium chloride 0.9 % flush 10 mL (has no administration in time range)   dextrose (D5W) 5 % infusion (has no administration in time range)   piperacillin-tazobactam (ZOSYN) 3.375 g in iso-osmotic dextrose 50 ml (premix) (has no administration in time range)       Imaging results:  XR Chest 1 View    Result Date: 10/17/2023  Patchy density at the right lower lung may be edema or developing pneumonia, follow-up recommended.  This report was finalized on 10/17/2023 4:44 PM by Dr. Isai Diaz M.D on Workstation: [email or web address]      CT Head Without Contrast    Result Date: 10/17/2023   No acute intracranial hemorrhage or hydrocephalus. If there is further clinical concern, MRI could be considered for further evaluation.  This report was finalized on 10/17/2023 4:34 PM by Dr. Isai Diaz M.D on Workstation: [email or web address]       Ambulatory status:   - bedrest    Social issues:   Social History     Socioeconomic History    Marital status:    Tobacco Use    Smoking status: Every Day       NIH Stroke Scale:       Rosalba Belle RN  10/17/23 17:27 EDT

## 2023-10-17 NOTE — ED PROVIDER NOTES
" EMERGENCY DEPARTMENT ENCOUNTER    Room Number:  24/24  PCP: Jai Monroy MD  Patient Care Team:  Jai Monroy MD as PCP - General (Internal Medicine)  Salinas Blair MD as Consulting Physician (Internal Medicine)  Oleg Cortes MD as Consulting Physician (Hematology and Oncology)  Jai Monroy MD as Primary Care Provider (Internal Medicine)   Independent Historians: EMS report and patient's provider at NH    HPI:  Chief Complaint: decline in function, refusing to eat     A complete HPI/ROS/PMH/PSH/SH/FH are unobtainable due to: Pt with dementia and noncommunicative    Chronic or social conditions impacting patient care (Social Determinants of Health): Long term care facility patient  (Financial Resource Strain / Food Insecurity / Transportation Needs / Physical Activity / Stress / Social Connections / Intimate Partner Violence / Housing Stability)    Context: Gloria Sands is a 73 y.o. female who presents to the ED after general functional decline recently.  Patient refusing meals and noted to have abnormal vitals today.  Reportedly had a low blood pressure at the nursing home per EMS but reassuring blood pressure with EMS.  Patient hypoxic upon arrival here in 82% on room air.  When asked patient if she wears oxygen at the facility she said \"yes\" but nursing home report does not reflect this.  Patient unable to provide any further history.  When I asked her if she is in pain she said yes pain but would not elaborate any further.    Review of prior external notes (non-ED) -and- Review of prior external test results outside of this encounter: Patient is followed by Dr. Cortes with oncology for thrombocytopenia.  Patient actually had an appointment earlier today in follow-up of that issue and missed the appointment.    Prescription drug monitoring program review:         PAST MEDICAL HISTORY  Active Ambulatory Problems     Diagnosis Date Noted    Vitamin B12 deficiency 02/05/2016    Absolute " anemia 02/05/2016     Resolved Ambulatory Problems     Diagnosis Date Noted    No Resolved Ambulatory Problems     Past Medical History:   Diagnosis Date    Arthritis     B12 deficiency     Cirrhosis     Diabetes mellitus     Hypertension     Hypothyroidism     Leukopenia     Lymphocytic leukemia     Myelodysplasia (myelodysplastic syndrome)     Splenomegaly     Thrombocytopenia          PAST SURGICAL HISTORY  Past Surgical History:   Procedure Laterality Date    BONE MARROW BIOPSY W/ ASPIRATION  10/15/2012    HYSTERECTOMY      LUMBAR SPINE SURGERY      OTHER SURGICAL HISTORY      MULTIPLE SURGERIES FOR PAIN MANAGEMENT APPLIANCES         FAMILY HISTORY  No family history on file.      SOCIAL HISTORY  Social History     Socioeconomic History    Marital status:    Tobacco Use    Smoking status: Every Day         ALLERGIES  Meperidine hcl and Sumycin [tetracycline]        REVIEW OF SYSTEMS  Review of Systems  Included in HPI  All systems reviewed and negative except for those discussed in HPI.      PHYSICAL EXAM    I have reviewed the triage vital signs and nursing notes.    ED Triage Vitals [10/17/23 1526]   Temp Heart Rate Resp BP SpO2   96.7 °F (35.9 °C) 89 14 131/89 90 %      Temp src Heart Rate Source Patient Position BP Location FiO2 (%)   -- -- -- -- --       Physical Exam  GENERAL: Ill-appearing cachectic frail  female, moaning and giving some one-word answers but not appropriately, drowsy   SKIN: Cool with scattered ecchymoses  HENT: Normocephalic, atraumatic, pronounced underbite with protruding central lower teeth that are actually eroding into external upper lip with an abrasion and dried blood present  EYES: no scleral icterus, EOMI  CV: regular rhythm, regular rate  RESPIRATORY: normal effort, diminished at the bases bilaterally without any obvious wheezing  ABDOMEN: soft, nontender, nondistended, markedly thin  MUSCULOSKELETAL: no deformity, markedly thin extremities with scattered  ecchymoses diffusely  NEURO: Drowsy and not following commands, very limited verbal responses                                                             LAB RESULTS  Recent Results (from the past 24 hour(s))   Basic Metabolic Panel    Collection Time: 10/17/23  3:55 PM    Specimen: Blood   Result Value Ref Range    Glucose 148 (H) 65 - 99 mg/dL    BUN 41 (H) 8 - 23 mg/dL    Creatinine 0.65 0.57 - 1.00 mg/dL    Sodium 175 (C) 136 - 145 mmol/L    Potassium 4.3 3.5 - 5.2 mmol/L    Chloride 135 (H) 98 - 107 mmol/L    CO2 30.0 (H) 22.0 - 29.0 mmol/L    Calcium 9.8 8.6 - 10.5 mg/dL    BUN/Creatinine Ratio 63.1 (H) 7.0 - 25.0    Anion Gap 10.0 5.0 - 15.0 mmol/L    eGFR 91.9 >60.0 mL/min/1.73   CBC Auto Differential    Collection Time: 10/17/23  3:55 PM    Specimen: Blood   Result Value Ref Range    WBC 40.46 (C) 3.40 - 10.80 10*3/mm3    RBC 3.72 (L) 3.77 - 5.28 10*6/mm3    Hemoglobin 12.9 12.0 - 15.9 g/dL    Hematocrit 37.1 34.0 - 46.6 %    MCV 99.7 (H) 79.0 - 97.0 fL    MCH 34.7 (H) 26.6 - 33.0 pg    MCHC 34.8 31.5 - 35.7 g/dL    RDW 14.6 12.3 - 15.4 %    RDW-SD 52.5 37.0 - 54.0 fl    Platelets 47 (C) 140 - 450 10*3/mm3   Single High Sensitivity Troponin T    Collection Time: 10/17/23  3:55 PM    Specimen: Blood   Result Value Ref Range    HS Troponin T 136 (C) <10 ng/L   Magnesium    Collection Time: 10/17/23  3:55 PM    Specimen: Blood   Result Value Ref Range    Magnesium 3.0 (H) 1.6 - 2.4 mg/dL   TSH    Collection Time: 10/17/23  3:55 PM    Specimen: Blood   Result Value Ref Range    TSH 4.640 (H) 0.270 - 4.200 uIU/mL   BNP    Collection Time: 10/17/23  3:55 PM    Specimen: Blood   Result Value Ref Range    proBNP 1,477.0 0.0 - 1,800.0 pg/mL   Manual Differential    Collection Time: 10/17/23  3:55 PM    Specimen: Blood   Result Value Ref Range    Neutrophil % 17.0 (L) 42.7 - 76.0 %    Lymphocyte % 80.0 (H) 19.6 - 45.3 %    Monocyte % 3.0 (L) 5.0 - 12.0 %    Neutrophils Absolute 6.88 1.70 - 7.00 10*3/mm3     Lymphocytes Absolute 32.37 (H) 0.70 - 3.10 10*3/mm3    Monocytes Absolute 1.21 (H) 0.10 - 0.90 10*3/mm3    RBC Morphology Normal Normal    WBC Morphology Normal Normal    Platelet Morphology Normal Normal   Blood Gas, Arterial -    Collection Time: 10/17/23  4:14 PM    Specimen: Arterial Blood   Result Value Ref Range    Site Right Radial     Jostin's Test Positive     pH, Arterial 7.456 (H) 7.350 - 7.450 pH units    pCO2, Arterial 38.8 35.0 - 45.0 mm Hg    pO2, Arterial 71.6 (L) 80.0 - 100.0 mm Hg    HCO3, Arterial 27.3 22.0 - 28.0 mmol/L    Base Excess, Arterial 3.3 (H) 0.0 - 2.0 mmol/L    O2 Saturation, Arterial 95.0 92.0 - 98.5 %    CO2 Content 28.5 (H) 23 - 27 mmol/L    Barometric Pressure for Blood Gas 750.9000 mmHg    Modality Cannula     Flow Rate 6.0000 lpm    Set [facility] Resp Rate 24     Rate 24 Breaths/minute    Hemodilution No     Device Comment sat 93    ECG 12 Lead Altered Mental Status    Collection Time: 10/17/23  5:05 PM   Result Value Ref Range    QT Interval 386 ms    QTC Interval 459 ms       I ordered the above labs and independently reviewed the results.        RADIOLOGY  XR Chest 1 View    Result Date: 10/17/2023  XR CHEST 1 VW-  HISTORY: Female who is 73 years-old, altered mental status  TECHNIQUE: Frontal view of the chest  COMPARISON: None available  FINDINGS: The heart size is normal. Pulmonary vasculature is congested. Old granulomatous disease is present. Aorta is calcified. Patchy density at the right lower lung may be edema or developing pneumonia. No large pleural effusion, or pneumothorax. No acute osseous process.      Patchy density at the right lower lung may be edema or developing pneumonia, follow-up recommended.  This report was finalized on 10/17/2023 4:44 PM by Dr. Isai Diaz M.D on Workstation: [id number]      CT Head Without Contrast    Result Date: 10/17/2023  CT HEAD WO CONTRAST-  INDICATIONS: Mental status change  TECHNIQUE: Radiation dose reduction techniques were  utilized, including automated exposure control and exposure modulation based on body size. Noncontrast head CT  COMPARISON: None available  FINDINGS:    No acute intracranial hemorrhage, midline shift or mass effect. No acute territorial infarct is identified.  Mild periventricular hypodensities suggest chronic small vessel ischemic change in a patient this age.  Arterial calcifications are seen in the base of the brain.  Ventricles, cisterns, cerebral sulci are unremarkable for patient age.  The visualized paranasal sinuses, orbits, mastoid air cells are unremarkable.           No acute intracranial hemorrhage or hydrocephalus. If there is further clinical concern, MRI could be considered for further evaluation.  This report was finalized on 10/17/2023 4:34 PM by Dr. Isai Diaz M.D on Workstation: [id number]       I ordered the above noted radiological studies. Reviewed by me. See dictation for official radiology interpretation.      PROCEDURES    Procedures      MEDICATIONS GIVEN IN ER    Medications   sodium chloride 0.9 % flush 10 mL (has no administration in time range)   dextrose (D5W) 5 % infusion (has no administration in time range)   piperacillin-tazobactam (ZOSYN) 3.375 g in iso-osmotic dextrose 50 ml (premix) (has no administration in time range)         ORDERS PLACED DURING THIS VISIT:  Orders Placed This Encounter   Procedures    Respiratory Panel PCR w/COVID-19(SARS-CoV-2) [facility] In-House, NP Swab in UTM/VTM, 3-4 HR TAT - Swab, Nasopharynx    Blood Culture - Blood,    Blood Culture - Blood,    XR Chest 1 View    CT Head Without Contrast    Basic Metabolic Panel    CBC Auto Differential    Blood Gas, Arterial -    Single High Sensitivity Troponin T    Magnesium    TSH    Urinalysis With Microscopic If Indicated (No Culture) - Urine, Catheter    Lactic Acid, Plasma    Blood Gas, Arterial -    High Sensitivity Troponin T 2Hr    BNP    Manual Differential    Blood cultures may be  drawn from peripheral line  Misc Nursing Order (Specify)    IP General Consult (Use specialty-specific consult if known)    ECG 12 Lead Altered Mental Status    Insert Peripheral IV    Inpatient Admission    CBC & Differential         PROGRESS, DATA ANALYSIS, CONSULTS, AND MEDICAL DECISION MAKING    All labs have been independently interpreted by me.  All radiology studies have been reviewed by me.   EKG's independently viewed and interpreted by me.  Discussion below represents my analysis of pertinent findings related to patient's condition, differential diagnosis, treatment plan and final disposition.    MDM this patient presents with altered mental status.  The differential is broad and includes metabolic disturbances (hyper/hyponatremia, hypercalcemia, hypo/hyperglycemia, renal failure with uremia), infection/sepsis, alcohol intoxication or withdrawal, hypoxia/hypercapnia, hepatic encephalopathy, hyper/hypothyroidism, adrenal insufficiency, seizure, trauma, ICH, CVA.  Given the broad differential, plan to monitor patient and obtain EKG, accucheck, serum labs to include electrolytes, cbc, lfts, troponin, urine, CT head, and chest xray. Also may include ABG, LP, and antibiotic administration but will depend on vital signs, initial test results (anion gap etc), and clinical course.        ED Course as of 10/17/23 1755   Tue Oct 17, 2023   1557 Discussed patient's case with Dr. Monroy who said patient is declining in mental status and refusing feeds.  He is wondering if something more is going on to cause her decline or if this is just progression of her dementia and that she should be made comfort care. [AR]   1656 I viewed patient's chest x-ray and on my interpretation patient has bilateral patchy infiltrates versus vascular congestion right greater than left with no effusions. [AR]   1700 Patient's initial fluids changed from normal saline to D5W at 1.35 mL/kg/h for hypernatremia correction.  Also plan for Zosyn  given right lower lobe infiltrate and elevated white blood cell count.  Patient with markedly elevated troponin as well likely more related to patient's acute on chronic debility rather than acute MI.  Awaiting respiratory panel, urinalysis, BNP, lactic acid at this time.  Patient is not indicating any pain. [AR]   1713 EKG ER MD interpretation   Time: 1705  Rhythm and rate: Normal sinus rhythm at a rate of 85  Axis: Normal  P waves: Normal  QRS complexes: Normal  ST segments: ST depressions and T wave inversions diffusely including lead I, 2, 3, aVF, V2 through V6 [AR]   1714 Patient is a DNR per nursing home record.  Plan to call and discuss patient's case with Dr. Monroy again to decide if patient is comfort care or not as with patient's hyponatremia she will will require ICU care if not [AR]      ED Course User Index  [AR] Gayathri Rush MD       I interpreted the cardiac monitor rhythm and my independent interpretation is: normal sinus rhythm, rate of 80s. The cardiac monitor was ordered to monitor for arrhythmias.    PPE: I wore and adhered to appropriate PPE per hospital protocols for specific patient presentation. (For respiratory patients with suspected Covid-19 or other infectious etiology suspected for patient's symptoms, the patient wore a mask and I wore an N95 mask throughout the entire patient encounter.) Proper hand hygiene both before and after patient encounter was performed as well.         AS OF 17:55 EDT VITALS:    BP - 117/59  HR - 82  TEMP - 96.7 °F (35.9 °C)  O2 SATS - 98%        DIAGNOSIS  Final diagnoses:   Hypernatremia   Failure to thrive in adult   Decreased oral intake   Pneumonia due to infectious organism, unspecified laterality, unspecified part of lung   Hypoxia   Thrombocytopenia   Elevated troponin   CLL (chronic lymphocytic leukemia)         DISPOSITION  ED Disposition       ED Disposition   Decision to Admit    Condition   --    Comment   Level of Care: Stepdown [25]    Diagnosis: Hypernatremia [344675]   Admitting Physician: NAKIA MTZ [[id number]]   Attending Physician: NAKIA MTZ [[id number]]   Certification: I Certify That Inpatient Hospital Services Are Medically Necessary For Greater Than 2 Midnights                    Note Disclaimer: At [facility], we believe that sharing information builds trust and better relationships. You are receiving this note because you recently visited [facility]. It is possible you will see health information before a provider has talked with you about it. This kind of information can be easy to misunderstand. To help you fully understand what it means for your health, we urge you to discuss this note with your provider.         Gayathri Rush MD  10/17/23 1744       Gayathri Rush MD  10/17/23 1753

## 2023-10-17 NOTE — CASE MANAGEMENT/SOCIAL WORK
Verbal consent to treat from legal guardian, Brooklynn Leach.  present. Guardian paperwork requested from guardianship office. Ivonne GRIMALDO notified. Gayathri Hancock RN

## 2023-10-17 NOTE — ED NOTES
Patient from [facility]ems was called for being hypotensive but when they got there, her blood pressure was 131/89. They stated they called because patient had abnormal labs, they stated that her white blood cell count was low.

## 2023-10-17 NOTE — H&P
HISTORY AND PHYSICAL   [facility], [facility]      10/17/2023    Patient Identification:    Name: Gloria Sands  Age: 73 y.o.  Sex: female  :  1950  MRN: [id number]                       Primary Care Physician: Jai Monroy MD    Chief Complaint:      Chief Complaint   Patient presents with    Abnormal Lab         History of Present Illness:     Patient is a 73-year-old female, resident at the nursing facility.  Patient has history of liver cirrhosis, failure to thrive, bipolar disorder, bilateral macular degeneration, hypertension, hypothyroidism, psychotic disorder with hallucinations, diabetes mellitus, malnourishment, vascular dementia with behavioral disturbance.  Earlier today, patient was found to have blood pressure of 74/60, tachycardic, oxygen saturation was 82% on room air.  Patient was sent to the emergency room, in the ER, patient was found to have: Creatinine 0.65, sodium 75, WBC 14.46, hemoglobin 12.8, platelets 47, troponin 136, initial 3.0, 84.60, BNP 1477, chest x-ray show patchy densities in the right lower lung, CT of head showed no acute intracranial hemorrhage hydrocephalus.  Patient was admitted to the hospital for further management.        Past Medical History:  Past Medical History:   Diagnosis Date    Arthritis     B12 deficiency     Cirrhosis     Diabetes mellitus     Hypertension     Hypothyroidism     Leukopenia     Lymphocytic leukemia     B cell    Myelodysplasia (myelodysplastic syndrome)     Splenomegaly     Thrombocytopenia      Past Surgical History:  Past Surgical History:   Procedure Laterality Date    BONE MARROW BIOPSY W/ ASPIRATION  10/15/2012    HYSTERECTOMY      LUMBAR SPINE SURGERY      OTHER SURGICAL HISTORY      MULTIPLE SURGERIES FOR PAIN MANAGEMENT APPLIANCES      Home Meds:  (Not in a hospital admission)      Allergies:  Allergies   Allergen Reactions    Meperidine Hcl Nausea And Vomiting    Sumycin [Tetracycline]       Immunizations:  Immunization History   Administered Date(s) Administered    COVID-19 (PFIZER) BIVALENT 12+YRS 2022    COVID-19 (PFIZER) Purple Cap Monovalent 2020, 10/19/2021, 2022     Social History:   Social History     Social History Narrative    Not on file     Social History     Tobacco Use    Smoking status: Every Day    Smokeless tobacco: Not on file   Substance Use Topics    Alcohol use: Not on file     Family History:  No family history on file.     Review of Systems  See history of present illness and past medical history.          Objective:    Exam:    T MAX 24 hrs: Temp (24hrs), Av.7 °F (35.9 °C), Min:96.7 °F (35.9 °C), Max:96.7 °F (35.9 °C)    Vitals Ranges:   Temp:  [96.7 °F (35.9 °C)] 96.7 °F (35.9 °C)  Heart Rate:  [82-89] 82  Resp:  [14-24] 24  BP: (117-131)/(59-89) 117/59    /59   Pulse 82   Temp 96.7 °F (35.9 °C)   Resp 24   Wt 40.5 kg (89 lb 4.8 oz)   SpO2 98%   BMI 14.88 kg/m²     General: Somnolent, arousable, confused when aroused.  Cachectic.  On respiratory distress.  Saturation on 6 L 98%.    HEENT:    Head: Normocephalic, without obvious abnormality, atraumatic  Eyes: EOM are normal. Pupils are equal  Oropharynx: Mucosa and tongue dry  Neck: Supple, symmetrical, trachea midline, no adenopathy;              thyroid:  no enlargement/tenderness/nodules;              no carotid bruit or JVD  Cardiovascular: Normal rate, regular rhythm and intact distal pulses.              Exam reveals no gallop and no friction rub. No murmur heard  Chest wall: No tenderness or deformity  Pulmonary: Diminished breath sounds bilaterally, rhonchi bibasilarly.  No wheezing, no rales  Abdominal: Soft, splenomegaly.  Bowel sounds diminished.  No masses.    Extremities:  cachectic.  No edema.  Areas of ecchymosis.    Pulses: 1 + symmetric all extremities  Neurological: Patient is somnolent, arousable, but very confused when aroused.  No new focal sensorimotor deficit.     Sepsis  Right lower lobe pneumonia skin: Skin color, texture, normal. Turgor is decreased. No rashes or lesions      Data Review:    Results from last 7 days   Lab Units 10/17/23  1555   WBC 10*3/mm3 40.46*   HEMOGLOBIN g/dL 12.9   HEMATOCRIT % 37.1   PLATELETS 10*3/mm3 47*       Results from last 7 days   Lab Units 10/17/23  1555   SODIUM mmol/L 175*   POTASSIUM mmol/L 4.3   CHLORIDE mmol/L 135*   CO2 mmol/L 30.0*   BUN mg/dL 41*   CREATININE mg/dL 0.65   CALCIUM mg/dL 9.8   GLUCOSE mg/dL 148*                 Lab Results   Lab Value Date/Time    TROPONINT 136 (C) 10/17/2023 1555       Brief Urine Lab Results  (Last result in the past 365 days)        Color   Clarity   Blood   Leuk Est   Nitrite   Protein   CREAT   Urine HCG        10/17/23 1746 Dark Yellow   Clear   Negative   Trace   Negative   30 mg/dL (1+)                    Imaging Results (All)       Procedure Component Value Units Date/Time    XR Chest 1 View [[id number]] Collected: 10/17/23 1638     Updated: 10/17/23 1647    Narrative:      XR CHEST 1 VW-     HISTORY: Female who is 73 years-old, altered mental status     TECHNIQUE: Frontal view of the chest     COMPARISON: None available     FINDINGS: The heart size is normal. Pulmonary vasculature is congested.  Old granulomatous disease is present. Aorta is calcified. Patchy density  at the right lower lung may be edema or developing pneumonia. No large  pleural effusion, or pneumothorax. No acute osseous process.       Impression:      Patchy density at the right lower lung may be edema or  developing pneumonia, follow-up recommended.      This report was finalized on 10/17/2023 4:44 PM by Dr. Isai Diaz M.D on Workstation: [id number]       CT Head Without Contrast [[id number]] Collected: 10/17/23 1630     Updated: 10/17/23 1637    Narrative:      CT HEAD WO CONTRAST-     INDICATIONS: Mental status change     TECHNIQUE: Radiation dose reduction techniques were utilized, including  automated exposure  control and exposure modulation based on body size.  Noncontrast head CT     COMPARISON: None available     FINDINGS:           No acute intracranial hemorrhage, midline shift or mass effect. No acute  territorial infarct is identified.     Mild periventricular hypodensities suggest chronic small vessel ischemic  change in a patient this age.     Arterial calcifications are seen in the base of the brain.     Ventricles, cisterns, cerebral sulci are unremarkable for patient age.     The visualized paranasal sinuses, orbits, mastoid air cells are  unremarkable.                   Impression:         No acute intracranial hemorrhage or hydrocephalus. If there is further  clinical concern, MRI could be considered for further evaluation.     This report was finalized on 10/17/2023 4:34 PM by Dr. Isai Diaz M.D on Workstation: [id number]               Assessment:    Sepsis syndrome  Right lower lobe pneumonia  Severe hypernatremia  Dehydration  Elevated troponin, probably non-STEMI type II.  Thrombocytopenia  Lymphocytosis  Liver cirrhosis  Bipolar disorder  Hypertension  Hypothyroidism  Depression  Diabetes mellitus  Vascular dementia without behavioral disturbance    Plan:    Inpatient admission  IV fluids, patient is septic, has pneumonia, water deficit as well and dehydration..  Patient will be given IV fluid bolus, then we will continue D5W to replace free water.  IV antibiotics, to cover sepsis as well as right lower lobe pneumonia.  Patient troponin is very elevated, patient has creatinine which is mildly elevated from her baseline.  Patient may have type II NSTEMI.  Will give patient aspirin suppository oxygen.  Patient has no chest pain, no need for nitrates at this time.  We will ask cardiology to see patient.    Patient has lymphocytosis, will r/o  CLL, will ask hematology to see patient.  Patient is high risk for hepatic encephalopathy, will monitor closely.  Monitor and correct electrolytes  Accu-Chek  and SSI  Monitor mental status, lethargic, confused when aroused.  Not at baseline.  Home medications, will change to IV if necessary.  We will place patient n.p.o. until seen per speech therapy.  DVT prophylaxis with Lovenox  Stress ulcer prophylaxis with IV Pepcid  Labs in         Jai Monroy MD  10/17/2023  18:45 EDT

## 2023-10-17 NOTE — TELEPHONE ENCOUNTER
I attempted to call Brooklynn Leach, pts assigned guardian. This is the second call made in a week. I left a message letting her know the patient was a no show for her scans and she did not show up for her appointment with Dr. Cortes today. I asked for a return call and gave her my direct line.

## 2023-10-18 DIAGNOSIS — D53.9 MACROCYTIC ANEMIA: ICD-10-CM

## 2023-10-18 DIAGNOSIS — D72.820 LYMPHOCYTOSIS: ICD-10-CM

## 2023-10-18 DIAGNOSIS — D69.6 THROMBOCYTOPENIA: Primary | ICD-10-CM

## 2023-10-18 NOTE — CONSULTS
.     REASON FOR CONSULTATION:   Lymphocytosis. ? CLL   Provide an opinion on any further workup or treatment                             REQUESTING PHYSICIAN: Jai Monroy MD  RECORDS OBTAINED:  Records of the patient's history including those from the electronic medical record were reviewed and summarized in detail.    HISTORY OF PRESENT ILLNESS:  The patient is a 73 y.o. year old female  who is here for follow-up with the above-mentioned history.    She last saw Dr. Cortes in our office on 7/17/3 which was a follow-up of thrombocytopenia.  He noted she is very hard of hearing and her caregiver states she stays in bed most of the time.  She has paranoid schizophrenia and dementia.  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  He planned to see her a few months later (which would be now), but I do not see that that appointment was made.    Admitted through the ER 10/17/2023.  Sent in from her nursing facility.  History of cirrhosis failure to thrive.  Sent to the ER due to BP 74/60, oxygen 82% on room air.  We were consulted due to lymphocytosis.  She is admitted with sepsis syndrome and RLL pneumonia    WBC 40.5 on 10/17/2023 from 14.9 on 7/17/2023, from 9.1 on 8/17/2022.  Hb normal at 12.9.  PLT stable at 47.    History unobtainable from patient as she is nonverbal    Past Medical History:   Diagnosis Date    Arthritis     B12 deficiency     Cirrhosis     Diabetes mellitus     Hypertension     Hypothyroidism     Leukopenia     Lymphocytic leukemia     B cell    Myelodysplasia (myelodysplastic syndrome)     Splenomegaly     Thrombocytopenia      Past Surgical History:   Procedure Laterality Date    BONE MARROW BIOPSY W/ ASPIRATION  10/15/2012    HYSTERECTOMY      LUMBAR SPINE SURGERY      OTHER SURGICAL HISTORY      MULTIPLE SURGERIES FOR PAIN MANAGEMENT APPLIANCES       MEDICATIONS    Current Facility-Administered Medications:     acetaminophen (TYLENOL) suppository 650 mg, 650 mg, Rectal, Q6H PRN,  "Jai Monroy MD    aspirin suppository 300 mg, 300 mg, Rectal, Daily, Jai Monroy MD, 300 mg at 10/18/23 0944    cefepime 2 gm IVPB in 100 ml NS (VTB), 2,000 mg, Intravenous, Q12H, Jai Monroy MD, 2,000 mg at 10/18/23 0357    dextrose (D50W) (25 g/50 mL) IV injection 25 g, 25 g, Intravenous, Q15 Min PRN, Jai Monroy MD    dextrose (D5W) 5 % infusion, 100 mL/hr, Intravenous, Continuous, Jai Monroy MD, Last Rate: 100 mL/hr at 10/18/23 0251, 100 mL/hr at 10/18/23 0251    dextrose (GLUTOSE) oral gel 15 g, 15 g, Oral, Q15 Min PRN, Jai Monroy MD    glucagon (GLUCAGEN) injection 1 mg, 1 mg, Intramuscular, Q15 Min PRN, Jai Monroy MD    insulin lispro (HUMALOG/ADMELOG) injection 2-7 Units, 2-7 Units, Subcutaneous, 4x Daily AC & at Bedtime, Jai Monroy MD    [COMPLETED] Insert Peripheral IV, , , Once **AND** sodium chloride 0.9 % flush 10 mL, 10 mL, Intravenous, PRN, Jai Monroy MD    ALLERGIES:     Allergies   Allergen Reactions    Meperidine Hcl Nausea And Vomiting    Sumycin [Tetracycline]        SOCIAL HISTORY:       Social History     Socioeconomic History    Marital status:    Tobacco Use    Smoking status: Every Day   Vaping Use    Vaping Use: Unknown   Substance and Sexual Activity    Alcohol use: Defer    Drug use: Defer    Sexual activity: Defer         FAMILY HISTORY:  History reviewed. No pertinent family history.    REVIEW OF SYSTEMS:  Review of Systems   Unable to perform ROS: Mental status change              Vitals:    10/17/23 1908 10/17/23 2120 10/17/23 2314 10/18/23 0422   BP: 128/68 127/63 116/60 119/57   BP Location:  Right arm Right arm Right arm   Patient Position:  Lying Lying Lying   Pulse: 86 66 59    Resp:  24 24 24   Temp:       SpO2: 96%   97%   Weight:       Height:  160 cm (63\")           7/17/2023     2:13 PM   Current Status   ECOG score 2      PHYSICAL EXAM:    CONSTITUTIONAL:  Vital signs reviewed.  No distress, looks " comfortable.  EYES:  Conjunctivae and lids unremarkable.  PERRLA  EARS, NOSE, MOUTH, THROAT:  Ears and nose appear unremarkable.  Lips, teeth, gums appear unremarkable.  RESPIRATORY:  Normal respiratory effort.  Lungs clear to auscultation bilaterally.  CARDIOVASCULAR:  Normal S1, S2.  No murmurs, rubs or gallops.  No significant lower extremity edema.  GASTROINTESTINAL: Abdomen appears unremarkable.  Nontender.  No hepatomegaly.  No splenomegaly.  LYMPHATIC:  No cervical, supraclavicular, axillary lymphadenopathy.  NEURO: Unable to adequately assess as patient is confused.   MUSCULOSKELETAL:  Unremarkable digits/nails.  No cyanosis or clubbing.  No apparent joint deformities.  SKIN:  Warm.  No rashes.  PSYCHIATRIC:  Unable to adequately assess as patient is confused.      RECENT LABS:        WBC   Date Value Ref Range Status   10/17/2023 40.46 (C) 3.40 - 10.80 10*3/mm3 Final     Hemoglobin   Date Value Ref Range Status   10/17/2023 12.9 12.0 - 15.9 g/dL Final     Platelets   Date Value Ref Range Status   10/17/2023 47 (C) 140 - 450 10*3/mm3 Final       Assessment & Plan   Gloria AVENDANO Arturjaswinder [unfilled]     *Thrombocytopenia  Likely a result of platelet sequestration due to splenomegaly  The platelet count was 54,000 at her initial visit and has been stable for a number of years  Recent platelets 45,000 on 7/12/2023  Outpatient Dr. Cortes appointment: 7/17/2023: Platelets stable at 51,000  Admission for sepsis syndrome and pneumonia: 10/17/2023: PLT 47, stable     *Normocytic anemia  7/12/2023: Hemoglobin 9.1, lower than previous  7/17/2023: Hemoglobin 9.8, MCV higher at 103.1  10/17/2023: Hb up to 12.9.  This might be due to hemoconcentration and Hb might drop during this admission.     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  7/17/2023: The white blood  cell count is higher at 14.89 with 23% neutrophils and 69% lymphocytes.  10/17/2023: WBC 40.5 with 80% lymphocytes.  With CLL, sometimes the WBC (and absolute lymphocyte count), significantly rises when someone is ill with something such as sepsis syndrome and pneumonia.  PLT is stable.  Increased WBC alone would not be an indication for chemotherapy.  We will monitor.     *Paranoid schizophrenia     *Dementia    *Admitted 10/17/2023 for sepsis syndrome and pneumonia.  SBP was around 74 and O2 sat around 82% on room air prompting her nursing facility to send her to the ER, with subsequent admission.    Plan  We will follow.  I also asked the office to make an appointment with Dr. Cortes in 2 to 3 weeks, her outpatient hematologist oncologist    Chart reviewed and summarized including review of 6 CBCs since 2016.  Reviewed and summarized chart.  This was my first time meeting the patient.

## 2023-10-18 NOTE — CONSULTS
Date of Hospital Visit: 10/18/23  Encounter Provider: Rob Sarkar MD  Place of Service: [facility] CARDIOLOGY  Patient Name: Gloria Sands  :1950  [id number]  Referral Provider: Jai Monroy MD    Chief complaint: Decline in function, hypotension     History of Present Illness: Gloria Guillaume is a 73 y.o. patient who resides in a nursing  home with a past medical history of diabetes, cirrhosis, bipolar disorder, hypertension, hallucinations, vascular dementia and failure to thrive who presented to the ED yesterday after her blood pressure was found to be 74/60.  Upon arrival to the ED, her blood pressure had improved to 131/89, room air saturations were 82%.  She has had a recent decline in function and is refusing to eat.  She is a plasencia of the [facility].    Initial lab work reveals an elevated HS troponin 136->140, proBNP 1477.0, sodium 175, lactic acid 2.3 down from 3.6 and a white count of 40.  Blood cultures are pending.  CXR reveals density at the RLL which may represent edema or developing pneumonia.  EKG shows sinus rhythm with diffuse ST depression.      She was admitted on IV antibiotics.  Vital signs are stable, she is currently on 4L NC.  We have been consulted for elevated troponin's.    This is a incredibly sad case.  I have reviewed the above HPI and agree with it.  This is end-of-life care and she is cachectic multiple medical problems pressure ulcers malnourished not responsive demented.  She was hypotensive yesterday and then brought to the hospital.  Troponins are elevated ECG is abnormal.  She is not able to give any history.  The bigger thing is I have never seen a sodium of 175 before.    Past Medical History:   Diagnosis Date    Arthritis     B12 deficiency     Cirrhosis     Diabetes mellitus     Hypertension     Hypothyroidism     Leukopenia     Lymphocytic leukemia     B cell    Myelodysplasia (myelodysplastic syndrome)     Splenomegaly      Thrombocytopenia        Past Surgical History:   Procedure Laterality Date    BONE MARROW BIOPSY W/ ASPIRATION  10/15/2012    HYSTERECTOMY      LUMBAR SPINE SURGERY      OTHER SURGICAL HISTORY      MULTIPLE SURGERIES FOR PAIN MANAGEMENT APPLIANCES       Medications Prior to Admission   Medication Sig Dispense Refill Last Dose    ARIPiprazole (ABILIFY) 2 MG tablet        donepezil (ARICEPT) 10 MG tablet        doxepin (SINEquan) 100 MG capsule take 1 capsule by mouth at bedtime  0     HYDROcodone-acetaminophen (NORCO)  MG per tablet take 1 tablet by mouth every 6 hours if needed for pain  0     levothyroxine (SYNTHROID, LEVOTHROID) 100 MCG tablet Take 1 tablet(s) every day by oral route.  0     loperamide (IMODIUM) 2 MG capsule        LORazepam (ATIVAN) 0.5 MG tablet take 1 tablet by mouth if needed for anxiety  0     LORazepam (ATIVAN) 0.5 MG tablet Take  by mouth.       mirtazapine (REMERON) 7.5 MG tablet        QUEtiapine (SEROquel) 300 MG tablet 2 tablets Daily.  0     sertraline (ZOLOFT) 100 MG tablet        tiZANidine (ZANAFLEX) 4 MG tablet   0     triamcinolone (KENALOG) 0.5 % cream        venlafaxine (EFFEXOR) 50 MG tablet 2 (Two) Times a Day.  0        Current Meds  Scheduled Meds:aspirin, 300 mg, Rectal, Daily  cefepime, 2,000 mg, Intravenous, Q12H  insulin lispro, 2-7 Units, Subcutaneous, 4x Daily AC & at Bedtime      Continuous Infusions:dextrose, 100 mL/hr, Last Rate: 100 mL/hr (10/18/23 0251)      PRN Meds:.  acetaminophen    dextrose    dextrose    glucagon (human recombinant)    [COMPLETED] Insert Peripheral IV **AND** sodium chloride    Allergies as of 10/17/2023 - Reviewed 10/17/2023   Allergen Reaction Noted    Meperidine hcl Nausea And Vomiting 02/15/2013    Sumycin [tetracycline]  03/09/2016       Social History     Socioeconomic History    Marital status:    Tobacco Use    Smoking status: Every Day   Vaping Use    Vaping Use: Unknown   Substance and Sexual Activity    Alcohol use:  "Defer    Drug use: Defer    Sexual activity: Defer       History reviewed. No pertinent family history.    REVIEW OF SYSTEMS:   Not obtainable      Objective:   Temp:  [96.7 °F (35.9 °C)-97.3 °F (36.3 °C)] 97.3 °F (36.3 °C)  Heart Rate:  [59-89] 71  Resp:  [14-24] 24  BP: (111-131)/(57-89) 111/70  Body mass index is 15.82 kg/m².  Flowsheet Rows      Flowsheet Row First Filed Value   Admission Height 160 cm (63\") Documented at 10/17/2023 2120   Admission Weight 40.5 kg (89 lb 4.8 oz) Documented at 10/17/2023 1651          Vitals:    10/18/23 0738   BP: 111/70   Pulse: 71   Resp:    Temp: 97.3 °F (36.3 °C)   SpO2: 99%       Head:  Temporal muscle wasting   Eyes:            Lids and lashes normal, conjunctivae and sclerae normal, no   icterus, no pallor   Ears:    Ears appear intact with no abnormalities noted   Throat:      Neck:   No adenopathy, supple, trachea midline, no thyromegaly, no   carotid bruit, no JVD   Lungs:     Breath sounds are equal and clear to auscultation    Heart:    Normal S1 and S2, RRR, No M/G/R   Abdomen:     Normal bowel sounds, no masses, no organomegaly, soft        non-tender, non-distended, no guarding   Extremities:   Moves all extremities well, no edema, no cyanosis, no redness   Pulses:   Pulses palpable and equal bilaterally.    Skin:  Psychiatric: Covered with ecchymoses  Fetal position not able to respond             I personally viewed and interpreted the patient's EKG/Telemetry data    10-17-23      Assessment:  Active Hospital Problems    Diagnosis  POA    **Hypernatremia [E87.0]  Yes      Resolved Hospital Problems   No resolved problems to display.       Plan: This is end-of-life care and honestly anything beyond palliative care in my opinion borders on cruelty.  I think we need to really focus on comfort care for her she could be having a cardiac issue but in the scheme of things that there is nothing that can be done.  I will try and speak with Dr. Monroy.  I think the fact " that she is just stopped eating she is probably trying to tell us something that this is the end    Rob Sarkar MD  10/18/23  11:13 EDT.

## 2023-10-18 NOTE — CONSULTS
"Nutrition Services    Patient Name:  Gloria Sands  YOB: 1950  MRN: [id number]  Admit Date:  10/17/2023    Assessment Date:  10/18/23    Summary:     Pt is a 73 y.o. female adm for hypernatremia. H/o liver cirrhosis, failure to thrive, DM, malnourishment, and dementia. Nutrition assessment completed. Visited pt at bedside. Pt is nonverbal and unable to participate in interview. NFPE completed, severe malnutrition. Spoke with RN. Pt is currently NPO. RN endorses SLP is to evaluate. Labs/skin/meds reviewed. Left anterior thigh wound, stage 2 right lower sacral spine PI, left/right lower leg venous ulcer. On D5 and insulin.     Pt meet ASPEN/AND criteria for nutrition dx of severe malnutrition of chronic disease related to energy intake, muscle wasting, fat loss, and declining functional status.    REC:  Advance diet as tolerated and once medically appropriate per MD  Will add ONS once if/when diet is advanced  MD to manage hypernatremia     RD to follow per protocol.    CLINICAL NUTRITION ASSESSMENT      Reason for Assessment Nurse Admission Screen     Diagnosis/Problem   Hypernatremia    Medical/Surgical History Past Medical History:   Diagnosis Date    Arthritis     B12 deficiency     Cirrhosis     Diabetes mellitus     Hypertension     Hypothyroidism     Leukopenia     Lymphocytic leukemia     B cell    Myelodysplasia (myelodysplastic syndrome)     Splenomegaly     Thrombocytopenia        Past Surgical History:   Procedure Laterality Date    BONE MARROW BIOPSY W/ ASPIRATION  10/15/2012    HYSTERECTOMY      LUMBAR SPINE SURGERY      OTHER SURGICAL HISTORY      MULTIPLE SURGERIES FOR PAIN MANAGEMENT APPLIANCES        Anthropometrics        Current Height  Current Weight  BMI kg/m2 Height: 160 cm (63\")  Weight: 40.5 kg (89 lb 4.8 oz) (10/17/23 1651)  Body mass index is 15.82 kg/m².   Adjusted BMI (if applicable)    BMI Category Underweight (18.4 or below)   Ideal Body Weight (IBW) 55.6 kg   Usual " Body Weight (UBW) Unknown    Weight Trend Loss 117 lb wt loss x 7 yrs    Weight History Wt Readings from Last 30 Encounters:   10/17/23 1651 40.5 kg (89 lb 4.8 oz)   03/22/17 1422 87 kg (191 lb 12.8 oz)   05/20/16 0800 93.8 kg (206 lb 12.8 oz)        Estimated Requirements         Weight used  40.5 kg     Calories  3182-1820 (30 kcal/kg, 35 kcal/kg)    Protein  49-61 (1.2 - 1.5 gm/kg)    Fluid  6831-1060 (1 mL/kcal)     Labs       Pertinent Labs    Results from last 7 days   Lab Units 10/17/23  1555   SODIUM mmol/L 175*   POTASSIUM mmol/L 4.3   CHLORIDE mmol/L 135*   CO2 mmol/L 30.0*   BUN mg/dL 41*   CREATININE mg/dL 0.65   CALCIUM mg/dL 9.8   GLUCOSE mg/dL 148*     Results from last 7 days   Lab Units 10/17/23  1555   MAGNESIUM mg/dL 3.0*   HEMOGLOBIN g/dL 12.9   HEMATOCRIT % 37.1   WBC 10*3/mm3 40.46*     Results from last 7 days   Lab Units 10/17/23  1555   PLATELETS 10*3/mm3 47*     COVID19   Date Value Ref Range Status   10/17/2023 Not Detected Not Detected - Ref. Range Final     Lab Results   Component Value Date    HGBA1C 5.40 10/17/2023          Medications           Scheduled Medications aspirin, 300 mg, Rectal, Daily  cefepime, 2,000 mg, Intravenous, Q12H  insulin lispro, 2-7 Units, Subcutaneous, 4x Daily AC & at Bedtime       Infusions dextrose, 100 mL/hr, Last Rate: 100 mL/hr (10/18/23 0251)       PRN Medications   acetaminophen    dextrose    dextrose    glucagon (human recombinant)    [COMPLETED] Insert Peripheral IV **AND** sodium chloride     Physical Findings          General Findings disoriented, frail, generalized wasting, loss of muscle mass, loss of subcutaneous fat, on oxygen therapy   Oral/Mouth Cavity tooth or teeth missing   Edema  not assessed   Gastrointestinal fecal incontinence, last bowel movement: pending    Skin  bruising, location of wound: left anterior thigh, stage 2 right lower sacral spine  PI, left lower leg venous ulcer, right lower leg venous ulcer    Tubes/Drains/Lines none    NFPE See Malnutrition Severity Assessment     Malnutrition Severity Assessment      Patient meets criteria for : (P) Severe Malnutrition (Pt meet ASPEN/AND criteria for nutrition dx of severe malnutrition of chronic disease related to energy intake, muscle wasting, fat loss, and declining functional status.)  Malnutrition Type (last 8 hours)       Malnutrition Severity Assessment       Row Name 10/18/23 0954       Malnutrition Severity Assessment    Malnutrition Type Chronic Disease - Related Malnutrition (P)       Row Name 10/18/23 0954       Insufficient Energy Intake     Insufficient Energy Intake Findings Severe (P)     Insufficient Energy Intake  <50% of est. energy requirement for >or equal to 1 month (P)       Row Name 10/18/23 0954       Muscle Loss    Loss of Muscle Mass Findings Severe (P)     Sikhism Region Severe - deep hollowing/scooping, lack of muscle to touch, facial bones well defined (P)     Clavicle Bone Region Severe - protruding prominent bone (P)     Acromion Bone Region Severe - squared shoulders, bones, and acromion process protrusion prominent (P)     Scapular Bone Region Severe - prominent bones, depressions easily visible between ribs, scapula, spine, shoulders (P)     Dorsal Hand Region Severe - prominent depression (P)     Patellar Region Severe - prominent bone, square looking, very little muscle definition (P)     Anterior Thigh Region Severe - line/depression along thigh, obviously thin (P)     Posterior Calf Region Severe - thin with very little definition/firmness (P)       Row Name 10/18/23 0954       Fat Loss    Subcutaneous Fat Loss Findings Severe (P)     Orbital Region  Severe - pronounced hollowness/depression, dark circles, loose saggy skin (P)     Upper Arm Region Severe - mostly skin, very little space between folds, fingers touch (P)     Thoracic & Lumbar Region Severe - ribs visible with prominent depressions, iliac crest very prominent (P)       Row Name 10/18/23 0954        Declining Functional Status    Declining Functional Status Findings Measurably Reduced (P)       Row Name 10/18/23 0954       Criteria Met (Must meet criteria for severity in at least 2 of these categories: M Wasting, Fat Loss, Fluid, Secondary Signs, Wt. Status, Intake)    Patient meets criteria for  Severe Malnutrition (P)   Pt meet ASPEN/AND criteria for nutrition dx of severe malnutrition of chronic disease related to energy intake, muscle wasting, fat loss, and declining functional status.                     Current Nutrition Orders & Evaluation of Intake       Oral Nutrition     Food Allergies NKFA   Current PO Diet NPO Diet NPO Type: Strict NPO   Supplement n/a   PO Evaluation     % PO Intake NPO    Factors Affecting Intake: altered mental status, decreased appetite   --  PES STATEMENT / NUTRITION DIAGNOSIS      Nutrition Dx Problem  Problem: Malnutrition (severe)  Etiology: Factors Affecting Nutrition - decreased appetite, altered mental status      Signs/Symptoms: NFPE Results, BMI, and Unintended Weight Change     NUTRITION INTERVENTION / PLAN OF CARE      Intervention Goal(s) Maintain nutrition status, Improved nutrition related labs, Establish goals of care, Reduce/improve symptoms, Meet estimated needs, Initiate feeding/diet, Establish PO intake, Tolerate PO , Advance diet, Appropriate weight gain, and PO intake goal %: 75%         RD Intervention/Action Await initiation/advancement of PO diet, Continue to monitor, and Care plan reviewed   --      Prescription/Orders:       PO Diet ADAT      Supplements Barak BID B/D to support wound healing       Enteral Nutrition       Parenteral Nutrition    New Prescription Ordered? No, recommended   --      Monitor/Evaluation Per protocol   Discharge Plan/Needs Pending clinical course   --    RD to follow per protocol.      Electronically signed by:  Willow Chery Dietitian Intern   10/18/23 08:26 EDT

## 2023-10-18 NOTE — PLAN OF CARE
Goal Outcome Evaluation:  Plan of Care Reviewed With: patient        Progress: no change  Outcome Evaluation: telemetry monitor, sb. confused, baseline. 4 lpm via nc. q2 turn. wound care. iv fluids. bolus given. palliative considering.

## 2023-10-18 NOTE — DISCHARGE PLACEMENT REQUEST
"Amarilis Sands (73 y.o. Female)       Date of Birth   1950    Social Security Number       Address   [facility] [address]  [address] [place] [address]    Home Phone   [phone]    MRN   [id number]       Orthodoxy   Unknown    Marital Status                               Admission Date   10/17/23    Admission Type   Emergency    Admitting Provider   Jai Monroy MD    Attending Provider   Jai Monroy MD    Department, Room/Bed   [address], [address]       Discharge Date       Discharge Disposition       Discharge Destination                                 Attending Provider: Jai Monroy MD    Allergies: Meperidine Hcl, Sumycin [Tetracycline]    Isolation: Enh Drop/Con   Infection: COVID (rule out) (10/17/23)   Code Status: Not on file    Ht: 160 cm (63\")   Wt: 40.5 kg (89 lb 4.8 oz)    Admission Cmt: None   Principal Problem: Hypernatremia [E87.0]                   Active Insurance as of 10/17/2023       Primary Coverage       Payor Plan Insurance Group Employer/Plan Group    MEDICARE MEDICARE A & B        Payor Plan Address Payor Plan Phone Number Payor Plan Fax Number Effective Dates    [address] [phone]  10/1/2002 - None Entered    [facility] [id number]         Subscriber Name Subscriber Birth Date Member ID       AMARILIS SANDS 1950 [id number]               Secondary Coverage       Payor Plan Insurance Group Employer/Plan Group     FOR LIFE  FOR LIFE [employer]         Payor Plan Address Payor Plan Phone Number Payor Plan Fax Number Effective Dates    [address] [phone]  3/22/2016 - None Entered    [facility] [address]         Subscriber Name Subscriber Birth Date Member ID       AMARILIS SANDS 1950 [id number]               Tertiary Coverage       Payor Plan Insurance Group Employer/Plan Group    [place]        Payor Plan Address Payor Plan Phone " Number Payor Plan Fax Number Effective Dates    [address] [phone]  5/20/2016 - None Entered    [place] [id number]         Subscriber Name Subscriber Birth Date Member ID       AMARILIS KENNEY 1950 [id number]                     Emergency Contacts        (Rel.) Home Phone Work Phone Mobile Phone    ANA LE (Legal Guardian) [phone] -- [phone]    Jennifer Rogers (Daughter) [phone] -- --

## 2023-10-18 NOTE — PROGRESS NOTES
"[facility] Clinical Pharmacy Services: Vancomycin Pharmacokinetic Initial Consult Note    Gloria Sands is a 73 y.o. female who is on day 1 of pharmacy to dose vancomycin.    Indication: Pneumonia and Sepsis  Consulting Provider: Dr. Monroy  Planned Duration of Therapy: 5d  Loading Dose Ordered or Given: 750 mg on 10/17 at 2229  MRSA PCR performed: No  Culture/Source:   10/17 bld cxs pending  10/17 Respi cx NG  Target: -600 mg/L.hr   Pertinent Vanc Dosing History:   Other Antimicrobials: Cefepime    Vitals/Labs  Ht: 160 cm (63\"); Wt: 40.5 kg (89 lb 4.8 oz)  Temp Readings from Last 1 Encounters:   10/17/23 96.7 °F (35.9 °C)    Estimated Creatinine Clearance: 47.8 mL/min (by C-G formula based on SCr of 0.65 mg/dL).       Results from last 7 days   Lab Units 10/17/23  1555   CREATININE mg/dL 0.65   WBC 10*3/mm3 40.46*     Assessment/Plan:    Vancomycin Dose:   500 mg IV every  12  hours  Predictive AUC level for the dose ordered is 448 mg/L.hr, which is within the target of 400-600 mg/L.hr  Vanc Trough has been ordered for 10/19 at 1230     Pharmacy will follow patient's kidney function and will adjust doses and obtain levels as necessary. Thank you for involving pharmacy in this patient's care. Please contact pharmacy with any questions or concerns.                           Lilia Ochoa [facility]  Clinical Pharmacist    "

## 2023-10-18 NOTE — PROGRESS NOTES
"DAILY PROGRESS NOTE  [facility]    10/18/2023    Patient Identification:  Name: Gloria Sands  Age: 73 y.o.  Sex: female  :  1950  MRN: [id number]           Primary Care Physician: Jai Monroy MD    Subjective:    Interval History:    Patient is awake but very confused.  Saturation on 4 L 99%.  Afebrile  Sodium down to 166, potassium 2.7, blood sugar in the high 100s, lactate 3.6, WBC down to 15.8, platelets 28.      ROS:     No episodes of nausea, vomiting, diarrhea, constipation, shortness of breath or chest pain.      Objective:    Scheduled Meds:  aspirin, 300 mg, Rectal, Daily  cefepime, 2,000 mg, Intravenous, Q12H  insulin lispro, 2-7 Units, Subcutaneous, 4x Daily AC & at Bedtime        Continuous Infusions:  dextrose, 125 mL/hr, Last Rate: 125 mL/hr (10/18/23 1218)        PRN Meds:    acetaminophen    dextrose    dextrose    glucagon (human recombinant)    Potassium Replacement - Follow Nurse / BPA Driven Protocol    [COMPLETED] Insert Peripheral IV **AND** sodium chloride    Intake/Output:    Intake/Output Summary (Last 24 hours) at 10/18/2023 1745  Last data filed at 10/17/2023 2206  Gross per 24 hour   Intake 212.5 ml   Output --   Net 212.5 ml         Exam:    T MAX 24 hrs: Temp (24hrs), Av.4 °F (36.3 °C), Min:97.3 °F (36.3 °C), Max:97.5 °F (36.4 °C)    Vitals Ranges:   Temp:  [97.3 °F (36.3 °C)-97.5 °F (36.4 °C)] 97.5 °F (36.4 °C)  Heart Rate:  [57-86] 57  Resp:  [16-24] 16  BP: (111-128)/(49-70) 120/49    /49 (BP Location: Right arm, Patient Position: Lying)   Pulse 57   Temp 97.5 °F (36.4 °C) (Oral)   Resp 16   Ht 160 cm (63\")   Wt 40.5 kg (89 lb 4.8 oz)   SpO2 99%   BMI 15.82 kg/m²     General: Somnolent, arousable, confused when aroused.  Cachectic.    Neck: Supple, symmetrical, trachea midline, no adenopathy;              thyroid:  no enlargement/tenderness/nodules;              no carotid bruit or " JVD  Cardiovascular: Normal rate, regular rhythm and intact distal pulses.              Exam reveals no gallop and no friction rub. No murmur heard  Pulmonary: Diminished breath sounds bilaterally, rhonchi bibasilarly.  No wheezing, no rales  Abdominal: Soft, splenomegaly.  Bowel sounds diminished.  No masses.    Extremities:  cachectic.  No edema.  Areas of ecchymosis.    Pulses: 1 + symmetric all extremities  Neurological: Patient is somnolent, arousable, but confused when aroused.  No new focal sensorimotor deficit.    Skin: Skin color, texture, normal. Turgor is decreased. No rashes or lesions      Data Review:    Results from last 7 days   Lab Units 10/18/23  1004 10/17/23  1555   WBC 10*3/mm3 15.80* 40.46*   HEMOGLOBIN g/dL 11.2* 12.9   HEMATOCRIT % 32.9* 37.1   PLATELETS 10*3/mm3 28* 47*       Results from last 7 days   Lab Units 10/18/23  1004 10/17/23  1555   SODIUM mmol/L 166* 175*   POTASSIUM mmol/L 2.7* 4.3   CHLORIDE mmol/L 133* 135*   CO2 mmol/L 22.5 30.0*   BUN mg/dL 34* 41*   CREATININE mg/dL 0.56* 0.65   CALCIUM mg/dL 8.5* 9.8   BILIRUBIN mg/dL 2.0*  --    ALK PHOS U/L 86  --    ALT (SGPT) U/L 9  --    AST (SGOT) U/L 29  --    GLUCOSE mg/dL 194* 148*           Results from last 7 days   Lab Units 10/17/23  1555   HEMOGLOBIN A1C % 5.40       Lab Results   Lab Value Date/Time    TROPONINT 140 (C) 10/17/2023 2049    TROPONINT 136 (C) 10/17/2023 1555       Microbiology Results (last 10 days)       Procedure Component Value - Date/Time    MRSA Screen, PCR (Inpatient) - Swab, Nares [[id number]]  (Normal) Collected: 10/18/23 0106    Lab Status: Final result Specimen: Swab from Nares Updated: 10/18/23 0241     MRSA PCR No MRSA Detected    Narrative:      The negative predictive value of this diagnostic test is high and should only be used to consider de-escalating anti-MRSA therapy. A positive result may indicate colonization with MRSA and must be correlated clinically.    Respiratory Panel PCR  w/COVID-19(SARS-CoV-2) FABRICIO/ROGER/CAMILLA/PAD/COR/MAD/ARELY In-House, NP Swab in UTM/VTM, 3-4 HR TAT - Swab, Nasopharynx [[id number]]  (Normal) Collected: 10/17/23 1740    Lab Status: Final result Specimen: Swab from Nasopharynx Updated: 10/17/23 1848     ADENOVIRUS, PCR Not Detected     Coronavirus 229E Not Detected     Coronavirus HKU1 Not Detected     Coronavirus NL63 Not Detected     Coronavirus OC43 Not Detected     COVID19 Not Detected     Human Metapneumovirus Not Detected     Human Rhinovirus/Enterovirus Not Detected     Influenza A PCR Not Detected     Influenza B PCR Not Detected     Parainfluenza Virus 1 Not Detected     Parainfluenza Virus 2 Not Detected     Parainfluenza Virus 3 Not Detected     Parainfluenza Virus 4 Not Detected     RSV, PCR Not Detected     Bordetella pertussis pcr Not Detected     Bordetella parapertussis PCR Not Detected     Chlamydophila pneumoniae PCR Not Detected     Mycoplasma pneumo by PCR Not Detected    Narrative:      In the setting of a positive respiratory panel with a viral infection PLUS a negative procalcitonin without other underlying concern for bacterial infection, consider observing off antibiotics or discontinuation of antibiotics and continue supportive care. If the respiratory panel is positive for atypical bacterial infection (Bordetella pertussis, Chlamydophila pneumoniae, or Mycoplasma pneumoniae), consider antibiotic de-escalation to target atypical bacterial infection.    Blood Culture - Blood, Arm, Right [[id number]]  (Normal) Collected: 10/17/23 1650    Lab Status: Preliminary result Specimen: Blood from Arm, Right Updated: 10/18/23 1701     Blood Culture No growth at 24 hours    Narrative:      Less than seven (7) mL's of blood was collected.  Insufficient quantity may yield false negative results.             Imaging Results (Last 7 Days)       Procedure Component Value Units Date/Time    XR Chest 1 View [[id number]] Collected: 10/17/23 1638     Updated: 10/17/23  1647    Narrative:      XR CHEST 1 VW-     HISTORY: Female who is 73 years-old, altered mental status     TECHNIQUE: Frontal view of the chest     COMPARISON: None available     FINDINGS: The heart size is normal. Pulmonary vasculature is congested.  Old granulomatous disease is present. Aorta is calcified. Patchy density  at the right lower lung may be edema or developing pneumonia. No large  pleural effusion, or pneumothorax. No acute osseous process.       Impression:      Patchy density at the right lower lung may be edema or  developing pneumonia, follow-up recommended.      This report was finalized on 10/17/2023 4:44 PM by Dr. Isai Diaz M.D on Workstation: [id number]       CT Head Without Contrast [[id number]] Collected: 10/17/23 1630     Updated: 10/17/23 1637    Narrative:      CT HEAD WO CONTRAST-     INDICATIONS: Mental status change     TECHNIQUE: Radiation dose reduction techniques were utilized, including  automated exposure control and exposure modulation based on body size.  Noncontrast head CT     COMPARISON: None available     FINDINGS:           No acute intracranial hemorrhage, midline shift or mass effect. No acute  territorial infarct is identified.     Mild periventricular hypodensities suggest chronic small vessel ischemic  change in a patient this age.     Arterial calcifications are seen in the base of the brain.     Ventricles, cisterns, cerebral sulci are unremarkable for patient age.     The visualized paranasal sinuses, orbits, mastoid air cells are  unremarkable.                   Impression:         No acute intracranial hemorrhage or hydrocephalus. If there is further  clinical concern, MRI could be considered for further evaluation.     This report was finalized on 10/17/2023 4:34 PM by Dr. Isai Diaz M.D on Workstation: [id number]                 Assessment:      Sepsis syndrome  Right lower lobe pneumonia  Severe hypernatremia  Dehydration  Elevated troponin, probably  non-STEMI type II.  Thrombocytopenia  Lymphocytosis  Liver cirrhosis with splenomegaly   Bipolar disorder  Hypertension  Hypothyroidism  Depression  Diabetes mellitus  Vascular dementia without behavioral disturbance    Plan:    Continue IV fluids, patient has sepsis, water deficit, dehydrated.  Continue current IV fluids.  Monitor renal function.  Lactate still elevated, will give additional bolus of normal saline.  Continue IV antibiotics, will continue cefepime, vancomycin discontinued due to negative MRSA screen.  Appreciate cardiology consult.  Appreciate hematology input  Monitor and correct electrolytes  Adjust insulin as needed  Monitor mental status still very confused, not at baseline, although better than on admission.  Continue DVT prophylaxis with SCDs.  Cannot use Lovenox, patient platelets are low.  Continue stress of prophylaxis with IV Pepcid   Patient is DNR, however, her prognosis is very poor due to multiple comorbidities including advanced dementia, advanced liver cirrhosis with splenomegaly causing severe thrombocytopenia, recurrent infections, possible CLL.  I am recommending changing his CODE STATUS to DNR and his care structure to hospice care and comfort measures.   Labs in am      Jai Monroy MD  10/18/2023  17:45 EDT

## 2023-10-18 NOTE — CASE MANAGEMENT/SOCIAL WORK
Discharge Planning Assessment  [facility]     Patient Name: Gloria Sands  MRN: [id number]  Today's Date: 10/18/2023    Admit Date: 10/17/2023    Plan: Undetermined   Discharge Needs Assessment    No documentation.                  Discharge Plan       Row Name 10/18/23 1604       Plan    Plan Undetermined    Plan Comments Spoke with Hang, pt admitted from [facility] and rehab. Pt is LTC with bed hold, can return anytime, will need ems/stretcher transport. LVM with pt legal guardian, to discuss IMM, if dtr listed can have information and if okay for vm at d/c. Guardian has not returned call.  Physicians have decided comfort care is best for this patient, PAUL[facility] paperwork printed, await MD signature to send records to PAULINE office. Dr. Sarkar has signed, await Dr. Monroy, referral ready to fax once obtained, CCP will follow - Emma PINEDA                  Continued Care and Services - Admitted Since 10/17/2023       Destination Coordination complete.      Service Provider Request Status Selected Services Address Phone Fax Patient Preferred    [facility]  Selected Nursing Home [address] [place] [address] [phone] --                  Expected Discharge Date and Time       Expected Discharge Date Expected Discharge Time    Oct 21, 2023            Demographic Summary    No documentation.                  Functional Status    No documentation.                  Psychosocial    No documentation.                  Abuse/Neglect    No documentation.                  Legal    No documentation.                  Substance Abuse    No documentation.                  Patient Forms    No documentation.                     Emma Santos RN

## 2023-10-18 NOTE — DISCHARGE PLACEMENT REQUEST
"Amarilis Sands (73 y.o. Female)       Date of Birth   1950    Social Security Number       Address   [facility] [address]  [address] [place] [address]    Home Phone   [phone]    MRN   [id number]       [facility]   Unknown    Marital Status                               Admission Date   10/17/23    Admission Type   Emergency    Admitting Provider   Jai Monroy MD    Attending Provider   Jai Monroy MD    Department, Room/Bed   [address], [address]       Discharge Date       Discharge Disposition       Discharge Destination                                 Attending Provider: Jai Monroy MD    Allergies: Meperidine Hcl, Sumycin [Tetracycline]    Isolation: Contact   Infection: Candida Auris (rule out) (10/18/23)   Code Status: Not on file    Ht: 160 cm (63\")   Wt: 40.5 kg (89 lb 4.8 oz)    Admission Cmt: None   Principal Problem: Hypernatremia [E87.0]                   Active Insurance as of 10/17/2023       Primary Coverage       Payor Plan Insurance Group Employer/Plan Group    MEDICARE MEDICARE A & B        Payor Plan Address Payor Plan Phone Number Payor Plan Fax Number Effective Dates    [address] [phone]  10/1/2002 - None Entered    [facility] [id number]         Subscriber Name Subscriber Birth Date Member ID       AMARILIS SANDS 1950 [id number]               Secondary Coverage       Payor Plan Insurance Group Employer/Plan Group     FOR LIFE  FOR LIFE  SUP         Payor Plan Address Payor Plan Phone Number Payor Plan Fax Number Effective Dates    [address] [phone]  3/22/2016 - None Entered    [facility] [address]         Subscriber Name Subscriber Birth Date Member ID       AMARILIS SANDS 1950 [id number]               Tertiary Coverage       Payor Plan Insurance Group Employer/Plan Group    [place]        Payor Plan Address Payor Plan " Phone Number Payor Plan Fax Number Effective Dates    [address] [phone]  2016 - None Entered    [place] [address]         Subscriber Name Subscriber Birth Date Member ID       AMARILIS KENNEY 1950 [id number]                     Emergency Contacts        (Rel.) Home Phone Work Phone Mobile Phone    ANA LE (Legal Guardian) [phone] -- [phone]    Jennifer Rogers (Daughter) [phone] -- --                 History & Physical        Jai Monroy MD at 10/17/23 1849          HISTORY AND PHYSICAL   [facility], [facility]      10/17/2023    Patient Identification:    Name: Amarilis Kenney  Age: 73 y.o.  Sex: female  :  1950  MRN: [id number]                       Primary Care Physician: Jai Monroy MD    Chief Complaint:      Chief Complaint   Patient presents with    Abnormal Lab         History of Present Illness:     Patient is a 73-year-old female, resident at the nursing facility.  Patient has history of liver cirrhosis, failure to thrive, bipolar disorder, bilateral macular degeneration, hypertension, hypothyroidism, psychotic disorder with hallucinations, diabetes mellitus, malnourishment, vascular dementia with behavioral disturbance.  Earlier today, patient was found to have blood pressure of 74/60, tachycardic, oxygen saturation was 82% on room air.  Patient was sent to the emergency room, in the ER, patient was found to have: Creatinine 0.65, sodium 75, WBC 14.46, hemoglobin 12.8, platelets 47, troponin 136, initial 3.0, 84.60, BNP 1477, chest x-ray show patchy densities in the right lower lung, CT of head showed no acute intracranial hemorrhage hydrocephalus.  Patient was admitted to the hospital for further management.        Past Medical History:  Past Medical History:   Diagnosis Date    Arthritis     B12 deficiency     Cirrhosis     Diabetes mellitus     Hypertension     Hypothyroidism     Leukopenia     Lymphocytic leukemia      B cell    Myelodysplasia (myelodysplastic syndrome)     Splenomegaly     Thrombocytopenia      Past Surgical History:  Past Surgical History:   Procedure Laterality Date    BONE MARROW BIOPSY W/ ASPIRATION  10/15/2012    HYSTERECTOMY      LUMBAR SPINE SURGERY      OTHER SURGICAL HISTORY      MULTIPLE SURGERIES FOR PAIN MANAGEMENT APPLIANCES      Home Meds:  (Not in a hospital admission)      Allergies:  Allergies   Allergen Reactions    Meperidine Hcl Nausea And Vomiting    Sumycin [Tetracycline]      Immunizations:  Immunization History   Administered Date(s) Administered    COVID-19 (PFIZER) BIVALENT 12+YRS 2022    COVID-19 (PFIZER) Purple Cap Monovalent 2020, 10/19/2021, 2022     Social History:   Social History     Social History Narrative    Not on file     Social History     Tobacco Use    Smoking status: Every Day    Smokeless tobacco: Not on file   Substance Use Topics    Alcohol use: Not on file     Family History:  No family history on file.     Review of Systems  See history of present illness and past medical history.          Objective:    Exam:    T MAX 24 hrs: Temp (24hrs), Av.7 °F (35.9 °C), Min:96.7 °F (35.9 °C), Max:96.7 °F (35.9 °C)    Vitals Ranges:   Temp:  [96.7 °F (35.9 °C)] 96.7 °F (35.9 °C)  Heart Rate:  [82-89] 82  Resp:  [14-24] 24  BP: (117-131)/(59-89) 117/59    /59   Pulse 82   Temp 96.7 °F (35.9 °C)   Resp 24   Wt 40.5 kg (89 lb 4.8 oz)   SpO2 98%   BMI 14.88 kg/m²     General: Somnolent, arousable, confused when aroused.  Cachectic.  On respiratory distress.  Saturation on 6 L 98%.    HEENT:    Head: Normocephalic, without obvious abnormality, atraumatic  Eyes: EOM are normal. Pupils are equal  Oropharynx: Mucosa and tongue dry  Neck: Supple, symmetrical, trachea midline, no adenopathy;              thyroid:  no enlargement/tenderness/nodules;              no carotid bruit or JVD  Cardiovascular: Normal rate, regular rhythm and intact distal pulses.               Exam reveals no gallop and no friction rub. No murmur heard  Chest wall: No tenderness or deformity  Pulmonary: Diminished breath sounds bilaterally, rhonchi bibasilarly.  No wheezing, no rales  Abdominal: Soft, splenomegaly.  Bowel sounds diminished.  No masses.    Extremities:  cachectic.  No edema.  Areas of ecchymosis.    Pulses: 1 + symmetric all extremities  Neurological: Patient is somnolent, arousable, but very confused when aroused.  No new focal sensorimotor deficit.    Sepsis  Right lower lobe pneumonia skin: Skin color, texture, normal. Turgor is decreased. No rashes or lesions      Data Review:    Results from last 7 days   Lab Units 10/17/23  1555   WBC 10*3/mm3 40.46*   HEMOGLOBIN g/dL 12.9   HEMATOCRIT % 37.1   PLATELETS 10*3/mm3 47*       Results from last 7 days   Lab Units 10/17/23  1555   SODIUM mmol/L 175*   POTASSIUM mmol/L 4.3   CHLORIDE mmol/L 135*   CO2 mmol/L 30.0*   BUN mg/dL 41*   CREATININE mg/dL 0.65   CALCIUM mg/dL 9.8   GLUCOSE mg/dL 148*                 Lab Results   Lab Value Date/Time    TROPONINT 136 (C) 10/17/2023 1555       Brief Urine Lab Results  (Last result in the past 365 days)        Color   Clarity   Blood   Leuk Est   Nitrite   Protein   CREAT   Urine HCG        10/17/23 1746 Dark Yellow   Clear   Negative   Trace   Negative   30 mg/dL (1+)                    Imaging Results (All)       Procedure Component Value Units Date/Time    XR Chest 1 View [[id number]] Collected: 10/17/23 1638     Updated: 10/17/23 1647    Narrative:      XR CHEST 1 VW-     HISTORY: Female who is 73 years-old, altered mental status     TECHNIQUE: Frontal view of the chest     COMPARISON: None available     FINDINGS: The heart size is normal. Pulmonary vasculature is congested.  Old granulomatous disease is present. Aorta is calcified. Patchy density  at the right lower lung may be edema or developing pneumonia. No large  pleural effusion, or pneumothorax. No acute osseous process.        Impression:      Patchy density at the right lower lung may be edema or  developing pneumonia, follow-up recommended.      This report was finalized on 10/17/2023 4:44 PM by Dr. Isai Diaz M.D on Workstation: [id number]       CT Head Without Contrast [[id number]] Collected: 10/17/23 1630     Updated: 10/17/23 1637    Narrative:      CT HEAD WO CONTRAST-     INDICATIONS: Mental status change     TECHNIQUE: Radiation dose reduction techniques were utilized, including  automated exposure control and exposure modulation based on body size.  Noncontrast head CT     COMPARISON: None available     FINDINGS:           No acute intracranial hemorrhage, midline shift or mass effect. No acute  territorial infarct is identified.     Mild periventricular hypodensities suggest chronic small vessel ischemic  change in a patient this age.     Arterial calcifications are seen in the base of the brain.     Ventricles, cisterns, cerebral sulci are unremarkable for patient age.     The visualized paranasal sinuses, orbits, mastoid air cells are  unremarkable.                   Impression:         No acute intracranial hemorrhage or hydrocephalus. If there is further  clinical concern, MRI could be considered for further evaluation.     This report was finalized on 10/17/2023 4:34 PM by Dr. Isai Diaz M.D on Workstation: [id number]               Assessment:    Sepsis syndrome  Right lower lobe pneumonia  Severe hypernatremia  Dehydration  Elevated troponin, probably non-STEMI type II.  Thrombocytopenia  Lymphocytosis  Liver cirrhosis  Bipolar disorder  Hypertension  Hypothyroidism  Depression  Diabetes mellitus  Vascular dementia without behavioral disturbance    Plan:    Inpatient admission  IV fluids, patient is septic, has pneumonia, water deficit as well and dehydration..  Patient will be given IV fluid bolus, then we will continue D5W to replace free water.  IV antibiotics, to cover sepsis as well as right lower lobe  pneumonia.  Patient troponin is very elevated, patient has creatinine which is mildly elevated from her baseline.  Patient may have type II NSTEMI.  Will give patient aspirin suppository oxygen.  Patient has no chest pain, no need for nitrates at this time.  We will ask cardiology to see patient.    Patient has lymphocytosis, will r/o  CLL, will ask hematology to see patient.  Patient is high risk for hepatic encephalopathy, will monitor closely.  Monitor and correct electrolytes  Accu-Chek and SSI  Monitor mental status, lethargic, confused when aroused.  Not at baseline.  Home medications, will change to IV if necessary.  We will place patient n.p.o. until seen per speech therapy.  DVT prophylaxis with Lovenox  Stress ulcer prophylaxis with IV Pepcid  Labs in am        Jai Monroy MD  10/17/2023  18:45 EDT         Electronically signed by Jai Monroy MD at 10/18/23 0023       Lab Results (last 72 hours)       Procedure Component Value Units Date/Time    Pathology Consultation [[id number]] Collected: 10/17/23 1555    Specimen: Blood Updated: 10/18/23 1253     Final Diagnosis --     1.  Blood, peripheral smear:  A.  Leukocytosis with absolute atypical lymphocytosis, mild absolute neutrophilia and numerous smudge cells.  The atypical lymphocytes have an atypical condensed chromatin pattern.  No definitive circulating blasts are identified.  Significantly increased numbers of prolymphocytes are not identified.  Lymphocytosis of this degree and morphology is suggestive of a neoplastic lymphoproliferative disorder, most commonly chronic lymphocytic leukemia.  Reactive and neoplastic lymphoproliferation may be  by follow-up CBC in 3-6 months or bone marrow examination.  The patient should also be evaluated for possible lymphadenopathy/splenomegaly.  B.  Macrocytic anemia.  Macrocytic anemia may be seen with B12/folate deficiency, alcoholism, hypothyroidism, liver disease, malnutrition, or various  drugs.  Idiopathic refractory anemia also tend to be macrocytic.  C.  Thrombocytopenia.  In general, thrombocytopenia can be categorized into causes due to: (1) decreased platelet production (seen with bone marrow replacement by malignancy or with certain drugs including thiazides, alcohol, estrogens); (2) increased platelet destruction (as with idiopathic thrombocytopenic purpura or thrombotic thrombocytopenic purpura); (3) increased sequestration (as with hypersplenism); or (4) with platelet dilution (as with hemorrhage and multiple blood transfusions).  Useful tests and workup of these patients may include bone marrow examination, Vicente test, serum protein electrophoresis, NISHA, and test for antiplatelet antibodies.       Case Report --     Surgical Pathology Report                         Case: [id number]                                  Authorizing Provider:  Gayathri Rush MD   Collected:           10/17/2023 03:55 PM          Ordering Location:     [facility]  Received:            10/18/2023 09:16 AM                                 [address]                                                                      Pathologist:           Lamont Sam MD                                                          Specimen:                                                                                               Comprehensive Metabolic Panel [[id number]]  (Abnormal) Collected: 10/18/23 1004    Specimen: Blood Updated: 10/18/23 1205     Glucose 194 mg/dL      BUN 34 mg/dL      Creatinine 0.56 mg/dL      Sodium 166 mmol/L      Potassium 2.7 mmol/L      Chloride 133 mmol/L      CO2 22.5 mmol/L      Calcium 8.5 mg/dL      Total Protein 5.8 g/dL      Albumin 2.8 g/dL      ALT (SGPT) 9 U/L      AST (SGOT) 29 U/L      Alkaline Phosphatase 86 U/L      Total Bilirubin 2.0 mg/dL      Globulin 3.0 gm/dL      A/G Ratio 0.9 g/dL      BUN/Creatinine Ratio 60.7     Anion Gap 10.5 mmol/L      eGFR 95.3  mL/min/1.73     Narrative:      GFR Normal >60  Chronic Kidney Disease <60  Kidney Failure <15    The GFR formula is only valid for adults with stable renal function between ages 18 and 70.    POC Glucose Once [[id number]]  (Abnormal) Collected: 10/18/23 1121    Specimen: Blood Updated: 10/18/23 1123     Glucose 205 mg/dL     Manual Differential [[id number]]  (Abnormal) Collected: 10/18/23 1004    Specimen: Blood Updated: 10/18/23 1120     Neutrophil % 58.4 %      Lymphocyte % 40.0 %      Monocyte % 1.6 %      Neutrophils Absolute 9.23 10*3/mm3      Lymphocytes Absolute 6.32 10*3/mm3      Monocytes Absolute 0.25 10*3/mm3      Mai Cells Large/3+     Ovalocytes Mod/2+     Poikilocytes Large/3+     Smudge Cells Large/3+     Platelet Morphology Normal    CBC & Differential [[id number]]  (Abnormal) Collected: 10/18/23 1004    Specimen: Blood Updated: 10/18/23 1120    Narrative:      The following orders were created for panel order CBC & Differential.  Procedure                               Abnormality         Status                     ---------                               -----------         ------                     CBC Auto Differential[[id number]]        Abnormal            Final result                 Please view results for these tests on the individual orders.    CBC Auto Differential [[id number]]  (Abnormal) Collected: 10/18/23 1004    Specimen: Blood Updated: 10/18/23 1120     WBC 15.80 10*3/mm3      RBC 3.21 10*6/mm3      Hemoglobin 11.2 g/dL      Hematocrit 32.9 %      .5 fL      MCH 34.9 pg      MCHC 34.0 g/dL      RDW 14.4 %      RDW-SD 53.1 fl      MPV 13.6 fL      Platelets 28 10*3/mm3     STAT Lactic Acid, Reflex [[id number]]  (Abnormal) Collected: 10/18/23 1004    Specimen: Blood Updated: 10/18/23 1038     Lactate 3.6 mmol/L     Ammonia [[id number]]  (Normal) Collected: 10/18/23 1004    Specimen: Blood Updated: 10/18/23 1034     Ammonia 31 umol/L     CBC & Differential [[id number]]  (Abnormal)  Collected: 10/17/23 1555    Specimen: Blood Updated: 10/18/23 0915    Narrative:      The following orders were created for panel order CBC & Differential.  Procedure                               Abnormality         Status                     ---------                               -----------         ------                     CBC Auto Differential[[id number]]        Abnormal            Edited Result - FINAL      Path Consult Reflex[[id number]]                              Final result                 Please view results for these tests on the individual orders.    Path Consult Reflex [[id number]] Collected: 10/17/23 1555    Specimen: Blood Updated: 10/18/23 0915     Pathology Review Yes    CBC Auto Differential [[id number]]  (Abnormal) Collected: 10/17/23 1555    Specimen: Blood Updated: 10/18/23 0847     WBC 40.46 10*3/mm3      RBC 3.72 10*6/mm3      Hemoglobin 12.9 g/dL      Hematocrit 37.1 %      MCV 99.7 fL      MCH 34.7 pg      MCHC 34.8 g/dL      RDW 14.6 %      RDW-SD 52.5 fl      Platelets 47 10*3/mm3     Manual Differential [[id number]]  (Abnormal) Collected: 10/17/23 1555    Specimen: Blood Updated: 10/18/23 0847     Neutrophil % 17.0 %      Lymphocyte % 80.0 %      Monocyte % 3.0 %      Neutrophils Absolute 6.88 10*3/mm3      Lymphocytes Absolute 32.37 10*3/mm3      Monocytes Absolute 1.21 10*3/mm3      RBC Morphology Normal     WBC Morphology Normal     Platelet Morphology Normal    POC Glucose Once [[id number]]  (Abnormal) Collected: 10/18/23 0618    Specimen: Blood Updated: 10/18/23 0620     Glucose 191 mg/dL     Hemoglobin A1c [[id number]]  (Normal) Collected: 10/17/23 1555    Specimen: Blood Updated: 10/18/23 0258     Hemoglobin A1C 5.40 %     Narrative:      Hemoglobin A1C Ranges:    Increased Risk for Diabetes  5.7% to 6.4%  Diabetes                     >= 6.5%  Diabetic Goal                < 7.0%    MRSA Screen, PCR (Inpatient) - Swab, Nares [[id number]]  (Normal) Collected: 10/18/23 0106    Specimen:  Swab from Nares Updated: 10/18/23 0241     MRSA PCR No MRSA Detected    Narrative:      The negative predictive value of this diagnostic test is high and should only be used to consider de-escalating anti-MRSA therapy. A positive result may indicate colonization with MRSA and must be correlated clinically.    STAT Lactic Acid, Reflex [[id number]]  (Abnormal) Collected: 10/18/23 0053    Specimen: Blood Updated: 10/18/23 0140     Lactate 2.3 mmol/L     High Sensitivity Troponin T 2Hr [[id number]]  (Abnormal) Collected: 10/17/23 2049    Specimen: Blood Updated: 10/17/23 2123     HS Troponin T 140 ng/L      Troponin T Delta 4 ng/L     Narrative:      High Sensitive Troponin T Reference Range:  <10.0 ng/L- Negative Female for AMI  <15.0 ng/L- Negative Male for AMI  >=10 - Abnormal Female indicating possible myocardial injury.  >=15 - Abnormal Male indicating possible myocardial injury.   Clinicians would have to utilize clinical acumen, EKG, Troponin, and serial changes to determine if it is an Acute Myocardial Infarction or myocardial injury due to an underlying chronic condition.         STAT Lactic Acid, Reflex [[id number]]  (Abnormal) Collected: 10/17/23 2049    Specimen: Blood Updated: 10/17/23 2123     Lactate 2.7 mmol/L     Blood Culture - Blood, Arm, Left [[id number]] Collected: 10/17/23 1837    Specimen: Blood from Arm, Left Updated: 10/17/23 1852    Respiratory Panel PCR w/COVID-19(SARS-CoV-2) FABRICIO/ROGER/CAMILLA/PAD/COR/MAD/ARELY In-House, NP Swab in [facility], 3-4 HR TAT - Swab, Nasopharynx [[id number]]  (Normal) Collected: 10/17/23 1740    Specimen: Swab from Nasopharynx Updated: 10/17/23 1848     ADENOVIRUS, PCR Not Detected     Coronavirus 229E Not Detected     Coronavirus HKU1 Not Detected     Coronavirus NL63 Not Detected     Coronavirus OC43 Not Detected     COVID19 Not Detected     Human Metapneumovirus Not Detected     Human Rhinovirus/Enterovirus Not Detected     Influenza A PCR Not Detected     Influenza B PCR Not  Detected     Parainfluenza Virus 1 Not Detected     Parainfluenza Virus 2 Not Detected     Parainfluenza Virus 3 Not Detected     Parainfluenza Virus 4 Not Detected     RSV, PCR Not Detected     Bordetella pertussis pcr Not Detected     Bordetella parapertussis PCR Not Detected     Chlamydophila pneumoniae PCR Not Detected     Mycoplasma pneumo by PCR Not Detected    Narrative:      In the setting of a positive respiratory panel with a viral infection PLUS a negative procalcitonin without other underlying concern for bacterial infection, consider observing off antibiotics or discontinuation of antibiotics and continue supportive care. If the respiratory panel is positive for atypical bacterial infection (Bordetella pertussis, Chlamydophila pneumoniae, or Mycoplasma pneumoniae), consider antibiotic de-escalation to target atypical bacterial infection.    Urinalysis With Microscopic If Indicated (No Culture) - Straight Cath [[id number]]  (Abnormal) Collected: 10/17/23 1746    Specimen: Urine from Straight Cath Updated: 10/17/23 1830     Color, UA Dark Yellow     Appearance, UA Clear     pH, UA 6.0     Specific Gravity, UA 1.025     Glucose, UA Negative     Ketones, UA Trace     Bilirubin, UA Negative     Blood, UA Negative     Protein, UA 30 mg/dL (1+)     Leuk Esterase, UA Trace     Nitrite, UA Negative     Urobilinogen, UA 1.0 E.U./dL    Urinalysis, Microscopic Only - Straight Cath [[id number]] Collected: 10/17/23 1746    Specimen: Urine from Straight Cath Updated: 10/17/23 1830     RBC, UA 0-2 /HPF      WBC, UA 0-2 /HPF      Bacteria, UA None Seen /HPF      Squamous Epithelial Cells, UA 0-2 /HPF      Hyaline Casts, UA 3-6 /LPF      Methodology Automated Microscopy    Lactic Acid, Plasma [[id number]]  (Abnormal) Collected: 10/17/23 1740    Specimen: Blood Updated: 10/17/23 1823     Lactate 3.6 mmol/L     BNP [[id number]]  (Normal) Collected: 10/17/23 1555    Specimen: Blood Updated: 10/17/23 1733     proBNP 1,477.0  pg/mL     Narrative:      This assay is used as an aid in the diagnosis of individuals suspected of having heart failure. It can be used as an aid in the diagnosis of acute decompensated heart failure (ADHF) in patients presenting with signs and symptoms of ADHF to the emergency department (ED). In addition, NT-proBNP of <300 pg/mL indicates ADHF is not likely.    Age Range Result Interpretation  NT-proBNP Concentration (pg/mL:      <50             Positive            >450                   Gray                 300-450                    Negative             <300    50-75           Positive            >900                  Gray                300-900                  Negative            <300      >75             Positive            >1800                  Gray                300-1800                  Negative            <300    Blood Culture - Blood, Arm, Right [[id number]] Collected: 10/17/23 1650    Specimen: Blood from Arm, Right Updated: 10/17/23 1655    Single High Sensitivity Troponin T [[id number]]  (Abnormal) Collected: 10/17/23 1555    Specimen: Blood Updated: 10/17/23 1652     HS Troponin T 136 ng/L      Comment: Specimen hemolyzed.  Results may be affected.       Narrative:      High Sensitive Troponin T Reference Range:  <10.0 ng/L- Negative Female for AMI  <15.0 ng/L- Negative Male for AMI  >=10 - Abnormal Female indicating possible myocardial injury.  >=15 - Abnormal Male indicating possible myocardial injury.   Clinicians would have to utilize clinical acumen, EKG, Troponin, and serial changes to determine if it is an Acute Myocardial Infarction or myocardial injury due to an underlying chronic condition.         TSH [[id number]]  (Abnormal) Collected: 10/17/23 1555    Specimen: Blood Updated: 10/17/23 1643     TSH 4.640 uIU/mL     Basic Metabolic Panel [[id number]]  (Abnormal) Collected: 10/17/23 1555    Specimen: Blood Updated: 10/17/23 1642     Glucose 148 mg/dL      BUN 41 mg/dL      Creatinine 0.65  mg/dL      Sodium 175 mmol/L      Potassium 4.3 mmol/L      Comment: Specimen hemolyzed.  Results may be affected.        Chloride 135 mmol/L      CO2 30.0 mmol/L      Calcium 9.8 mg/dL      BUN/Creatinine Ratio 63.1     Anion Gap 10.0 mmol/L      eGFR 91.9 mL/min/1.73     Narrative:      GFR Normal >60  Chronic Kidney Disease <60  Kidney Failure <15    The GFR formula is only valid for adults with stable renal function between ages 18 and 70.    Magnesium [[id number]]  (Abnormal) Collected: 10/17/23 1555    Specimen: Blood Updated: 10/17/23 1642     Magnesium 3.0 mg/dL     Blood Gas, Arterial - [[id number]]  (Abnormal) Collected: 10/17/23 1614    Specimen: Arterial Blood Updated: 10/17/23 1618     Site Right Radial     Jostin's Test Positive     pH, Arterial 7.456 pH units      pCO2, Arterial 38.8 mm Hg      pO2, Arterial 71.6 mm Hg      HCO3, Arterial 27.3 mmol/L      Base Excess, Arterial 3.3 mmol/L      Comment: Serial Number: [id number]:  [id number]        O2 Saturation, Arterial 95.0 %      CO2 Content 28.5 mmol/L      Barometric Pressure for Blood Gas 750.9000 mmHg      Modality Cannula     Flow Rate 6.0000 lpm      Set [facility] Resp Rate 24     Rate 24 Breaths/minute      Hemodilution No     Device Comment sat 93          Physician Progress Notes (last 48 hours)  Notes from 10/16/23 1531 through 10/18/23 1531   No notes of this type exist for this encounter.          Consult Notes (last 48 hours)        Code, Alexei SHIELDS II, MD at 10/18/23 0945        Consult Orders    1. Hematology & Oncology Inpatient Consult [[id number]] ordered by Jai Monroy MD at 10/18/23 0024                   Subjective  .     REASON FOR CONSULTATION:   Lymphocytosis. ? CLL   Provide an opinion on any further workup or treatment                             REQUESTING PHYSICIAN: Jai Monroy MD  RECORDS OBTAINED:  Records of the patient's history including those from the electronic medical record were reviewed and summarized in  detail.    HISTORY OF PRESENT ILLNESS:  The patient is a 73 y.o. year old female  who is here for follow-up with the above-mentioned history.    She last saw Dr. Cortes in our office on 7/17/3 which was a follow-up of thrombocytopenia.  He noted she is very hard of hearing and her caregiver states she stays in bed most of the time.  She has paranoid schizophrenia and dementia.  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  He planned to see her a few months later (which would be now), but I do not see that that appointment was made.    Admitted through the ER 10/17/2023.  Sent in from her nursing facility.  History of cirrhosis failure to thrive.  Sent to the ER due to BP 74/60, oxygen 82% on room air.  We were consulted due to lymphocytosis.  She is admitted with sepsis syndrome and RLL pneumonia    WBC 40.5 on 10/17/2023 from 14.9 on 7/17/2023, from 9.1 on 8/17/2022.  Hb normal at 12.9.  PLT stable at 47.    History unobtainable from patient as she is nonverbal    Past Medical History:   Diagnosis Date    Arthritis     B12 deficiency     Cirrhosis     Diabetes mellitus     Hypertension     Hypothyroidism     Leukopenia     Lymphocytic leukemia     B cell    Myelodysplasia (myelodysplastic syndrome)     Splenomegaly     Thrombocytopenia      Past Surgical History:   Procedure Laterality Date    BONE MARROW BIOPSY W/ ASPIRATION  10/15/2012    HYSTERECTOMY      LUMBAR SPINE SURGERY      OTHER SURGICAL HISTORY      MULTIPLE SURGERIES FOR PAIN MANAGEMENT APPLIANCES       MEDICATIONS    Current Facility-Administered Medications:     acetaminophen (TYLENOL) suppository 650 mg, 650 mg, Rectal, Q6H PRN, Jai Monroy MD    aspirin suppository 300 mg, 300 mg, Rectal, Daily, Jai Monroy MD, 300 mg at 10/18/23 0944    cefepime 2 gm IVPB in 100 ml NS (VTB), 2,000 mg, Intravenous, Q12H, Jai Monroy MD, 2,000 mg at 10/18/23 0357    dextrose (D50W) (25 g/50 mL) IV injection 25 g, 25 g, Intravenous, Q15 Min  "SANDI, Jai Monroy MD    dextrose (D5W) 5 % infusion, 100 mL/hr, Intravenous, Continuous, Jai Monroy MD, Last Rate: 100 mL/hr at 10/18/23 0251, 100 mL/hr at 10/18/23 0251    dextrose (GLUTOSE) oral gel 15 g, 15 g, Oral, Q15 Min Porfirio JUNIOR Marlon R, MD    glucagon (GLUCAGEN) injection 1 mg, 1 mg, Intramuscular, Q15 Min Porfirio JUNIOR Marlon R, MD    insulin lispro (HUMALOG/ADMELOG) injection 2-7 Units, 2-7 Units, Subcutaneous, 4x Daily AC & at Bedtime, Jai Monroy MD    [COMPLETED] Insert Peripheral IV, , , Once **AND** sodium chloride 0.9 % flush 10 mL, 10 mL, Intravenous, Porfirio JUNIOR Marlon R, MD    ALLERGIES:     Allergies   Allergen Reactions    Meperidine Hcl Nausea And Vomiting    Sumycin [Tetracycline]        SOCIAL HISTORY:       Social History     Socioeconomic History    Marital status:    Tobacco Use    Smoking status: Every Day   Vaping Use    Vaping Use: Unknown   Substance and Sexual Activity    Alcohol use: Defer    Drug use: Defer    Sexual activity: Defer         FAMILY HISTORY:  History reviewed. No pertinent family history.    REVIEW OF SYSTEMS:  Review of Systems   Unable to perform ROS: Mental status change         Objective     Vitals:    10/17/23 1908 10/17/23 2120 10/17/23 2314 10/18/23 0422   BP: 128/68 127/63 116/60 119/57   BP Location:  Right arm Right arm Right arm   Patient Position:  Lying Lying Lying   Pulse: 86 66 59    Resp:  24 24 24   Temp:       SpO2: 96%   97%   Weight:       Height:  160 cm (63\")           7/17/2023     2:13 PM   Current Status   ECOG score 2      PHYSICAL EXAM:    CONSTITUTIONAL:  Vital signs reviewed.  No distress, looks comfortable.  EYES:  Conjunctivae and lids unremarkable.  PERRLA  EARS, NOSE, MOUTH, THROAT:  Ears and nose appear unremarkable.  Lips, teeth, gums appear unremarkable.  RESPIRATORY:  Normal respiratory effort.  Lungs clear to auscultation bilaterally.  CARDIOVASCULAR:  Normal S1, S2.  No murmurs, rubs or gallops.  No " significant lower extremity edema.  GASTROINTESTINAL: Abdomen appears unremarkable.  Nontender.  No hepatomegaly.  No splenomegaly.  LYMPHATIC:  No cervical, supraclavicular, axillary lymphadenopathy.  NEURO: Unable to adequately assess as patient is confused.   MUSCULOSKELETAL:  Unremarkable digits/nails.  No cyanosis or clubbing.  No apparent joint deformities.  SKIN:  Warm.  No rashes.  PSYCHIATRIC:  Unable to adequately assess as patient is confused.      RECENT LABS:        WBC   Date Value Ref Range Status   10/17/2023 40.46 (C) 3.40 - 10.80 10*3/mm3 Final     Hemoglobin   Date Value Ref Range Status   10/17/2023 12.9 12.0 - 15.9 g/dL Final     Platelets   Date Value Ref Range Status   10/17/2023 47 (C) 140 - 450 10*3/mm3 Final       Assessment & Plan   Gloria ROMANA Sands [unfilled]     *Thrombocytopenia  Likely a result of platelet sequestration due to splenomegaly  The platelet count was 54,000 at her initial visit and has been stable for a number of years  Recent platelets 45,000 on 7/12/2023  Outpatient Dr. Cortes appointment: 7/17/2023: Platelets stable at 51,000  Admission for sepsis syndrome and pneumonia: 10/17/2023: PLT 47, stable     *Normocytic anemia  7/12/2023: Hemoglobin 9.1, lower than previous  7/17/2023: Hemoglobin 9.8, MCV higher at 103.1  10/17/2023: Hb up to 12.9.  This might be due to hemoconcentration and Hb might drop during this admission.     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  7/17/2023: The white blood cell count is higher at 14.89 with 23% neutrophils and 69% lymphocytes.  10/17/2023: WBC 40.5 with 80% lymphocytes.  With CLL, sometimes the WBC (and absolute lymphocyte count), significantly rises when someone is ill with something such as sepsis syndrome and pneumonia.  PLT is stable.  Increased WBC alone would not be  an indication for chemotherapy.  We will monitor.     *Paranoid schizophrenia     *Dementia    *Admitted 10/17/2023 for sepsis syndrome and pneumonia.  SBP was around 74 and O2 sat around 82% on room air prompting her nursing facility to send her to the ER, with subsequent admission.    Plan  We will follow.  I also asked the office to make an appointment with Dr. Cortes in 2 to 3 weeks, her outpatient hematologist oncologist    Chart reviewed and summarized including review of 6 CBCs since 2016.  Reviewed and summarized chart.  This was my first time meeting the patient.                      Electronically signed by Alexei Martinez II, MD at 10/18/23 1036       Rob Sarkar MD at 10/18/23 0637        Consult Orders    1. Inpatient Cardiology Consult [[id number]] ordered by Jai Monroy MD at 10/17/23 2152                 Date of Hospital Visit: 10/18/23  Encounter Provider: Rob Sarkar MD  Place of Service: [facility] CARDIOLOGY  Patient Name: Gloria Sands  :1950  [id number]  Referral Provider: Jai Monroy MD    Chief complaint: Decline in function, hypotension     History of Present Illness: Gloria Guillaume is a 73 y.o. patient who resides in a nursing  home with a past medical history of diabetes, cirrhosis, bipolar disorder, hypertension, hallucinations, vascular dementia and failure to thrive who presented to the ED yesterday after her blood pressure was found to be 74/60.  Upon arrival to the ED, her blood pressure had improved to 131/89, room air saturations were 82%.  She has had a recent decline in function and is refusing to eat.  She is a plasencia of the [facility].    Initial lab work reveals an elevated HS troponin 136->140, proBNP 1477.0, sodium 175, lactic acid 2.3 down from 3.6 and a white count of 40.  Blood cultures are pending.  CXR reveals density at the RLL which may represent edema or developing pneumonia.  EKG shows sinus rhythm with diffuse ST  depression.      She was admitted on IV antibiotics.  Vital signs are stable, she is currently on 4L NC.  We have been consulted for elevated troponin's.    This is a incredibly sad case.  I have reviewed the above HPI and agree with it.  This is end-of-life care and she is cachectic multiple medical problems pressure ulcers malnourished not responsive demented.  She was hypotensive yesterday and then brought to the hospital.  Troponins are elevated ECG is abnormal.  She is not able to give any history.  The bigger thing is I have never seen a sodium of 175 before.    Past Medical History:   Diagnosis Date    Arthritis     B12 deficiency     Cirrhosis     Diabetes mellitus     Hypertension     Hypothyroidism     Leukopenia     Lymphocytic leukemia     B cell    Myelodysplasia (myelodysplastic syndrome)     Splenomegaly     Thrombocytopenia        Past Surgical History:   Procedure Laterality Date    BONE MARROW BIOPSY W/ ASPIRATION  10/15/2012    HYSTERECTOMY      LUMBAR SPINE SURGERY      OTHER SURGICAL HISTORY      MULTIPLE SURGERIES FOR PAIN MANAGEMENT APPLIANCES       Medications Prior to Admission   Medication Sig Dispense Refill Last Dose    ARIPiprazole (ABILIFY) 2 MG tablet        donepezil (ARICEPT) 10 MG tablet        doxepin (SINEquan) 100 MG capsule take 1 capsule by mouth at bedtime  0     HYDROcodone-acetaminophen (NORCO)  MG per tablet take 1 tablet by mouth every 6 hours if needed for pain  0     levothyroxine (SYNTHROID, LEVOTHROID) 100 MCG tablet Take 1 tablet(s) every day by oral route.  0     loperamide (IMODIUM) 2 MG capsule        LORazepam (ATIVAN) 0.5 MG tablet take 1 tablet by mouth if needed for anxiety  0     LORazepam (ATIVAN) 0.5 MG tablet Take  by mouth.       mirtazapine (REMERON) 7.5 MG tablet        QUEtiapine (SEROquel) 300 MG tablet 2 tablets Daily.  0     sertraline (ZOLOFT) 100 MG tablet        tiZANidine (ZANAFLEX) 4 MG tablet   0     triamcinolone (KENALOG) 0.5 % cream     "    venlafaxine (EFFEXOR) 50 MG tablet 2 (Two) Times a Day.  0        Current Meds  Scheduled Meds:aspirin, 300 mg, Rectal, Daily  cefepime, 2,000 mg, Intravenous, Q12H  insulin lispro, 2-7 Units, Subcutaneous, 4x Daily AC & at Bedtime      Continuous Infusions:dextrose, 100 mL/hr, Last Rate: 100 mL/hr (10/18/23 0251)      PRN Meds:.  acetaminophen    dextrose    dextrose    glucagon (human recombinant)    [COMPLETED] Insert Peripheral IV **AND** sodium chloride    Allergies as of 10/17/2023 - Reviewed 10/17/2023   Allergen Reaction Noted    Meperidine hcl Nausea And Vomiting 02/15/2013    Sumycin [tetracycline]  03/09/2016       Social History     Socioeconomic History    Marital status:    Tobacco Use    Smoking status: Every Day   Vaping Use    Vaping Use: Unknown   Substance and Sexual Activity    Alcohol use: Defer    Drug use: Defer    Sexual activity: Defer       History reviewed. No pertinent family history.    REVIEW OF SYSTEMS:   Not obtainable      Objective:   Temp:  [96.7 °F (35.9 °C)-97.3 °F (36.3 °C)] 97.3 °F (36.3 °C)  Heart Rate:  [59-89] 71  Resp:  [14-24] 24  BP: (111-131)/(57-89) 111/70  Body mass index is 15.82 kg/m².  Flowsheet Rows      Flowsheet Row First Filed Value   Admission Height 160 cm (63\") Documented at 10/17/2023 2120   Admission Weight 40.5 kg (89 lb 4.8 oz) Documented at 10/17/2023 1651          Vitals:    10/18/23 0738   BP: 111/70   Pulse: 71   Resp:    Temp: 97.3 °F (36.3 °C)   SpO2: 99%       Head:  Temporal muscle wasting   Eyes:            Lids and lashes normal, conjunctivae and sclerae normal, no   icterus, no pallor   Ears:    Ears appear intact with no abnormalities noted   Throat:      Neck:   No adenopathy, supple, trachea midline, no thyromegaly, no   carotid bruit, no JVD   Lungs:     Breath sounds are equal and clear to auscultation    Heart:    Normal S1 and S2, RRR, No M/G/R   Abdomen:     Normal bowel sounds, no masses, no organomegaly, soft        " non-tender, non-distended, no guarding   Extremities:   Moves all extremities well, no edema, no cyanosis, no redness   Pulses:   Pulses palpable and equal bilaterally.    Skin:  Psychiatric: Covered with ecchymoses  Fetal position not able to respond             I personally viewed and interpreted the patient's EKG/Telemetry data    10-17-23      Assessment:  Active Hospital Problems    Diagnosis  POA    **Hypernatremia [E87.0]  Yes      Resolved Hospital Problems   No resolved problems to display.       Plan: This is end-of-life care and honestly anything beyond palliative care in my opinion borders on cruelty.  I think we need to really focus on comfort care for her she could be having a cardiac issue but in the scheme of things that there is nothing that can be done.  I will try and speak with Dr. Monroy.  I think the fact that she is just stopped eating she is probably trying to tell us something that this is the end    Rob Sarkar MD  10/18/23  11:13 EDT.       Electronically signed by Rob Sarkar MD at 10/18/23 1115          Willow Chery   Dietetic Intern  Nutrition     Consults      Attested     Date of Service: 10/18/23 0826  Creation Time: 10/18/23 0826  Consult Orders   Inpatient Nutrition Consult [[id number]] ordered by Jai Monroy MD at 10/18/23 0407          Attested           Attestation signed by Dahiana Kamara RD at 10/18/23 1028     I have reviewed this documentation and agree.               Expand All Collapse All    Nutrition Services     Patient Name:  Gloria Sands  YOB: 1950  MRN: [id number]  Admit Date:  10/17/2023     Assessment Date:  10/18/23     Summary:      Pt is a 73 y.o. female adm for hypernatremia. H/o liver cirrhosis, failure to thrive, DM, malnourishment, and dementia. Nutrition assessment completed. Visited pt at bedside. Pt is nonverbal and unable to participate in interview. NFPE completed, severe malnutrition. Spoke with RN. Pt is  "currently NPO. RN endorses SLP is to evaluate. Labs/skin/meds reviewed. Left anterior thigh wound, stage 2 right lower sacral spine PI, left/right lower leg venous ulcer. On D5 and insulin.      Pt meet ASPEN/AND criteria for nutrition dx of severe malnutrition of chronic disease related to energy intake, muscle wasting, fat loss, and declining functional status.     REC:  Advance diet as tolerated and once medically appropriate per MD  Will add ONS once if/when diet is advanced  MD to manage hypernatremia      RD to follow per protocol.     CLINICAL NUTRITION ASSESSMENT        Reason for Assessment Nurse Admission Screen      Diagnosis/Problem    Hypernatremia    Medical/Surgical History Medical History        Past Medical History:   Diagnosis Date    Arthritis      B12 deficiency      Cirrhosis      Diabetes mellitus      Hypertension      Hypothyroidism      Leukopenia      Lymphocytic leukemia       B cell    Myelodysplasia (myelodysplastic syndrome)      Splenomegaly      Thrombocytopenia              Surgical History         Past Surgical History:   Procedure Laterality Date    BONE MARROW BIOPSY W/ ASPIRATION   10/15/2012    HYSTERECTOMY        LUMBAR SPINE SURGERY        OTHER SURGICAL HISTORY         MULTIPLE SURGERIES FOR PAIN MANAGEMENT APPLIANCES            Anthropometrics           Current Height  Current Weight  BMI kg/m2 Height: 160 cm (63\")  Weight: 40.5 kg (89 lb 4.8 oz) (10/17/23 1651)  Body mass index is 15.82 kg/m².   Adjusted BMI (if applicable)     BMI Category Underweight (18.4 or below)   Ideal Body Weight (IBW) 55.6 kg   Usual Body Weight (UBW) Unknown    Weight Trend Loss 117 lb wt loss x 7 yrs    Weight History      Wt Readings from Last 30 Encounters:   10/17/23 1651 40.5 kg (89 lb 4.8 oz)   03/22/17 1422 87 kg (191 lb 12.8 oz)   05/20/16 0800 93.8 kg (206 lb 12.8 oz)         Estimated Requirements            Weight used  40.5 kg     Calories  1185-4495 (30 kcal/kg, 35 kcal/kg)    Protein "  49-61 (1.2 - 1.5 gm/kg)    Fluid  6957-7094 (1 mL/kcal)          Labs         Pertinent Labs          Results from last 7 days   Lab Units 10/17/23  1555   SODIUM mmol/L 175*   POTASSIUM mmol/L 4.3   CHLORIDE mmol/L 135*   CO2 mmol/L 30.0*   BUN mg/dL 41*   CREATININE mg/dL 0.65   CALCIUM mg/dL 9.8   GLUCOSE mg/dL 148*           Results from last 7 days   Lab Units 10/17/23  1555   MAGNESIUM mg/dL 3.0*   HEMOGLOBIN g/dL 12.9   HEMATOCRIT % 37.1   WBC 10*3/mm3 40.46*           Results from last 7 days   Lab Units 10/17/23  1555   PLATELETS 10*3/mm3 47*            COVID19   Date Value Ref Range Status   10/17/2023 Not Detected Not Detected - Ref. Range Final            Lab Results   Component Value Date     HGBA1C 5.40 10/17/2023             Medications              Scheduled Medications aspirin, 300 mg, Rectal, Daily  cefepime, 2,000 mg, Intravenous, Q12H  insulin lispro, 2-7 Units, Subcutaneous, 4x Daily AC & at Bedtime         Infusions dextrose, 100 mL/hr, Last Rate: 100 mL/hr (10/18/23 0251)         PRN Medications   acetaminophen    dextrose    dextrose    glucagon (human recombinant)    [COMPLETED] Insert Peripheral IV **AND** sodium chloride      Physical Findings              General Findings disoriented, frail, generalized wasting, loss of muscle mass, loss of subcutaneous fat, on oxygen therapy   Oral/Mouth Cavity tooth or teeth missing   Edema  not assessed   Gastrointestinal fecal incontinence, last bowel movement: pending    Skin  bruising, location of wound: left anterior thigh, stage 2 right lower sacral spine  PI, left lower leg venous ulcer, right lower leg venous ulcer    Tubes/Drains/Lines none   NFPE See Malnutrition Severity Assessment      Malnutrition Severity Assessment       Patient meets criteria for : (P) Severe Malnutrition (Pt meet ASPEN/AND criteria for nutrition dx of severe malnutrition of chronic disease related to energy intake, muscle wasting, fat loss, and declining functional  status.)  Malnutrition Type (last 8 hours)         Malnutrition Severity Assessment         Row Name 10/18/23 0954           Malnutrition Severity Assessment     Malnutrition Type Chronic Disease - Related Malnutrition (P)         Row Name 10/18/23 0954           Insufficient Energy Intake      Insufficient Energy Intake Findings Severe (P)      Insufficient Energy Intake  <50% of est. energy requirement for >or equal to 1 month (P)         Row Name 10/18/23 0954           Muscle Loss     Loss of Muscle Mass Findings Severe (P)      Confucianist Region Severe - deep hollowing/scooping, lack of muscle to touch, facial bones well defined (P)      Clavicle Bone Region Severe - protruding prominent bone (P)      Acromion Bone Region Severe - squared shoulders, bones, and acromion process protrusion prominent (P)      Scapular Bone Region Severe - prominent bones, depressions easily visible between ribs, scapula, spine, shoulders (P)      Dorsal Hand Region Severe - prominent depression (P)      Patellar Region Severe - prominent bone, square looking, very little muscle definition (P)      Anterior Thigh Region Severe - line/depression along thigh, obviously thin (P)      Posterior Calf Region Severe - thin with very little definition/firmness (P)         Row Name 10/18/23 0954           Fat Loss     Subcutaneous Fat Loss Findings Severe (P)      Orbital Region  Severe - pronounced hollowness/depression, dark circles, loose saggy skin (P)      Upper Arm Region Severe - mostly skin, very little space between folds, fingers touch (P)      Thoracic & Lumbar Region Severe - ribs visible with prominent depressions, iliac crest very prominent (P)         Row Name 10/18/23 0954           Declining Functional Status     Declining Functional Status Findings Measurably Reduced (P)         Row Name 10/18/23 0954           Criteria Met (Must meet criteria for severity in at least 2 of these categories: M Wasting, Fat Loss, Fluid,  Secondary Signs, Wt. Status, Intake)     Patient meets criteria for  Severe Malnutrition (P)   Pt meet ASPEN/AND criteria for nutrition dx of severe malnutrition of chronic disease related to energy intake, muscle wasting, fat loss, and declining functional status.                              Current Nutrition Orders & Evaluation of Intake         Oral Nutrition      Food Allergies NKFA   Current PO Diet NPO Diet NPO Type: Strict NPO   Supplement n/a   PO Evaluation      % PO Intake NPO    Factors Affecting Intake: altered mental status, decreased appetite   --  PES STATEMENT / NUTRITION DIAGNOSIS        Nutrition Dx Problem  Problem: Malnutrition (severe)  Etiology: Factors Affecting Nutrition - decreased appetite, altered mental status      Signs/Symptoms: NFPE Results, BMI, and Unintended Weight Change      NUTRITION INTERVENTION / PLAN OF CARE        Intervention Goal(s) Maintain nutrition status, Improved nutrition related labs, Establish goals of care, Reduce/improve symptoms, Meet estimated needs, Initiate feeding/diet, Establish PO intake, Tolerate PO , Advance diet, Appropriate weight gain, and PO intake goal %: 75%            RD Intervention/Action Await initiation/advancement of PO diet, Continue to monitor, and Care plan reviewed   --        Prescription/Orders:        PO Diet ADAT      Supplements Barak BID B/D to support wound healing       Enteral Nutrition        Parenteral Nutrition     New Prescription Ordered? No, recommended   --        Monitor/Evaluation Per protocol   Discharge Plan/Needs Pending clinical course   --     RD to follow per protocol.        Electronically signed by:  Lynsey Nowak Intern   10/18/23 08:26 EDT              Cosigned by: Dahiana Kamara RD at 10/18/23 5208     Revision History

## 2023-10-19 NOTE — NURSING NOTE
10/19/23 0858   Wound 10/17/23 2113 Right lower sacral spine Pressure Injury   Placement Date/Time: 10/17/23 2113   Present on Original Admission: Yes  Side: Right  Orientation: lower  Location: sacral spine  Primary Wound Type: Pressure Injury   Pressure Injury Stage U   Dressing Appearance moist drainage   Base non-granulating;moist;necrotic;red;slough;yellow   Periwound redness   Periwound Temperature warm   Periwound Skin Turgor soft   Edges open   Drainage Characteristics/Odor serosanguineous   Drainage Amount small   Care, Wound cleansed with;sterile normal saline   Dressing Care dressing changed;hydrogel;silicone  (Therahoney used to the wound)   Periwound Care barrier film applied   Skin Interventions   Pressure Reduction Devices specialty bed utilized  (Low air loss mattres requested.)     Wound/Ostomy: Consult received regarding skin issue on Coccyx and bilateral lower extremities. Upon assessment is observed full-thickness skin and tissue loss on Sacrococcygeal area, Pressure injury unstageable POA. Therahoney, Opticel dressing and covered with Optifoam was used.  In addition we could see multiples full and partial-thickness skin and tissue loss, possibly related with vascular issue, Xeroform dressing placed, wrapped Kerlix.   Rn at bedside.  Wound care order and pressure standing measures have been implemented into Epic.   Low air loss mattress for adequate pressure redistribution and pressure relief from Agility and frame from [facility] will be requested, unit secretary  will call.  Please re-consult for any additional needs.

## 2023-10-19 NOTE — PROGRESS NOTES
No major change I strongly encourage stopping IV fluid and transferring her for palliative care there is no further cardiac evaluation indicated and we will see her as needed her prognosis is poor

## 2023-10-19 NOTE — PLAN OF CARE
Goal Outcome Evaluation:  Plan of Care Reviewed With: patient      GCS12; garbled speech- moans at times; quiet when comfortable. No respiratory distress. IVF continuous as ordered. Skin with scattered ecchymosis noted mainly on the upper extremities. Position of comfort. IV patent.

## 2023-10-19 NOTE — CASE MANAGEMENT/SOCIAL WORK
Continued Stay Note  [facility]     Patient Name: Gloria Sands  MRN: [id number]  Today's Date: 10/19/2023    Admit Date: 10/17/2023    Plan: Undetermined   Discharge Plan       Row Name 10/19/23 1146       Plan    Plan Undetermined    Patient/Family in Agreement with Plan yes    Plan Comments Paperwork emailed to [facility] office for Comfort care and DNR request. Await decision, attempted to reach legal Guardian again, left voicemail. Updated pt contact with guardianship office and after hours number. CCP will follow - Emma PINEDA                   Discharge Codes    No documentation.                 Expected Discharge Date and Time       Expected Discharge Date Expected Discharge Time    Oct 21, 2023               Emma Santos RN

## 2023-10-19 NOTE — CASE MANAGEMENT/SOCIAL WORK
Discharge Planning Assessment  [facility]     Patient Name: Gloria Sands  MRN: [id number]  Today's Date: 10/19/2023    Admit Date: 10/17/2023    Plan: Inpatient palliative once approved by [facility].   Discharge Needs Assessment    No documentation.                  Discharge Plan       Row Name 10/19/23 1520       Plan    Plan Inpatient palliative once approved by [facility].    Patient/Family in Agreement with Plan yes    Plan Comments Spoke with pt guardian Brooklynn, she states it is okay to speak to patients family, her daughter Jennifer and her sister Martha no decisions are to be made by them. CCP discussed IMM, refused copy. Plan for comfort care inpatient preferred once approved from [facility] office. Await decision after resending updated notes to them. DNR was approved and updated MD, and RN, status updated in Epic per MD. Anticipate pt moving to palliative care upon approval. CCP will follow - Emma PINEDA                  Continued Care and Services - Admitted Since 10/17/2023       Destination Coordination complete.      Service Provider Request Status Selected Services Address Phone Fax Patient Preferred    [facility]  Selected [facility] [address] [place] [address] [phone] --                  Expected Discharge Date and Time       Expected Discharge Date Expected Discharge Time    Oct 21, 2023            Demographic Summary    No documentation.                  Functional Status    No documentation.                  Psychosocial    No documentation.                  Abuse/Neglect    No documentation.                  Legal    No documentation.                  Substance Abuse    No documentation.                  Patient Forms    No documentation.                     Emma Santos RN

## 2023-10-19 NOTE — PROGRESS NOTES
"DAILY PROGRESS NOTE  [facility], [facility]    10/19/2023    Patient Identification:  Name: Gloria Sands  Age: 73 y.o.  Sex: female  :  1950  MRN: [id number]           Primary Care Physician: Jai Monroy MD    Subjective:    Interval History:    No much improvement.swelling, resident very confused.  Blood pressure on the low side, still on 4 L oxygen saturation 100%.  Afebrile.  Sodium down to 160, potassium 2.7, creatinine 0.52, lactate 3.9 platelets 20  Appreciate consultant recommendations      ROS:     No report for nausea, vomiting, diarrhea, shortness of breath.      Objective:    Scheduled Meds:  aspirin, 300 mg, Rectal, Daily  cefepime, 2,000 mg, Intravenous, Q12H  insulin lispro, 2-7 Units, Subcutaneous, 4x Daily AC & at Bedtime        Continuous Infusions:  dextrose, 125 mL/hr, Last Rate: 125 mL/hr (10/19/23 1147)        PRN Meds:    acetaminophen    dextrose    dextrose    glucagon (human recombinant)    Morphine    Potassium Replacement - Follow Nurse / BPA Driven Protocol    [COMPLETED] Insert Peripheral IV **AND** sodium chloride    Intake/Output:  No intake or output data in the 24 hours ending 10/19/23 1625        Exam:    T MAX 24 hrs: Temp (24hrs), Av.3 °F (36.3 °C), Min:96.6 °F (35.9 °C), Max:97.7 °F (36.5 °C)    Vitals Ranges:   Temp:  [96.6 °F (35.9 °C)-97.7 °F (36.5 °C)] 97.7 °F (36.5 °C)  Heart Rate:  [61-68] 68  Resp:  [16] 16  BP: ()/(39-57) 99/52    BP 99/52 (BP Location: Right arm, Patient Position: Lying)   Pulse 68   Temp 97.7 °F (36.5 °C) (Oral)   Resp 16   Ht 160 cm (63\")   Wt 40.5 kg (89 lb 4.8 oz)   SpO2 96%   BMI 15.82 kg/m²     General: Somnolent, arousable, very confused when aroused.  Cachectic.    Neck: Supple, symmetrical, trachea midline, no adenopathy;              thyroid:  no enlargement/tenderness/nodules;              no carotid bruit or JVD  Cardiovascular: Normal rate, regular rhythm and intact " distal pulses.              Exam reveals no gallop and no friction rub. No murmur heard  Pulmonary: Diminished breath sounds bilaterally, rhonchi bibasilarly.  No wheezing, no rales  Abdominal: Soft, splenomegaly.  Bowel sounds diminished.  No masses.    Extremities:  cachectic.  No edema.  Areas of ecchymosis.    Pulses: 1 + symmetric all extremities  Neurological: Patient is somnolent, arousable, but very confused when aroused.  No new focal sensorimotor deficit.    Skin: Skin color, texture, normal. Turgor is decreased. No rashes or lesions      Data Review:    Results from last 7 days   Lab Units 10/19/23  0127 10/18/23  1004 10/17/23  1555   WBC 10*3/mm3 11.95* 15.80* 40.46*   HEMOGLOBIN g/dL 9.8* 11.2* 12.9   HEMATOCRIT % 28.8* 32.9* 37.1   PLATELETS 10*3/mm3 20* 28* 47*       Results from last 7 days   Lab Units 10/19/23  1535 10/19/23  0127 10/18/23  1004 10/17/23  1555   SODIUM mmol/L  --  160* 166* 175*   POTASSIUM mmol/L 3.5 2.7* 2.7* 4.3   CHLORIDE mmol/L  --  126* 133* 135*   CO2 mmol/L  --  22.0 22.5 30.0*   BUN mg/dL  --  31* 34* 41*   CREATININE mg/dL  --  0.52* 0.56* 0.65   CALCIUM mg/dL  --  7.9* 8.5* 9.8   BILIRUBIN mg/dL  --   --  2.0*  --    ALK PHOS U/L  --   --  86  --    ALT (SGPT) U/L  --   --  9  --    AST (SGOT) U/L  --   --  29  --    GLUCOSE mg/dL  --  167* 194* 148*           Results from last 7 days   Lab Units 10/17/23  1555   HEMOGLOBIN A1C % 5.40       Lab Results   Lab Value Date/Time    TROPONINT 140 (C) 10/17/2023 2049    TROPONINT 136 (C) 10/17/2023 1555       Microbiology Results (last 10 days)       Procedure Component Value - Date/Time    MRSA Screen, PCR (Inpatient) - Swab, Nares [[id number]]  (Normal) Collected: 10/18/23 0106    Lab Status: Final result Specimen: Swab from Nares Updated: 10/18/23 0241     MRSA PCR No MRSA Detected    Narrative:      The negative predictive value of this diagnostic test is high and should only be used to consider de-escalating anti-MRSA  therapy. A positive result may indicate colonization with MRSA and must be correlated clinically.    Blood Culture - Blood, Arm, Left [[id number]]  (Normal) Collected: 10/17/23 1837    Lab Status: Preliminary result Specimen: Blood from Arm, Left Updated: 10/18/23 1901     Blood Culture No growth at 24 hours    Respiratory Panel PCR w/COVID-19(SARS-CoV-2) FABRICIO/ROGER/CAMILLA/PAD/COR/MAD/ARELY In-House, NP Swab in UTM/VTM, 3-4 HR TAT - Swab, Nasopharynx [[id number]]  (Normal) Collected: 10/17/23 1740    Lab Status: Final result Specimen: Swab from Nasopharynx Updated: 10/17/23 1848     ADENOVIRUS, PCR Not Detected     Coronavirus 229E Not Detected     Coronavirus HKU1 Not Detected     Coronavirus NL63 Not Detected     Coronavirus OC43 Not Detected     COVID19 Not Detected     Human Metapneumovirus Not Detected     Human Rhinovirus/Enterovirus Not Detected     Influenza A PCR Not Detected     Influenza B PCR Not Detected     Parainfluenza Virus 1 Not Detected     Parainfluenza Virus 2 Not Detected     Parainfluenza Virus 3 Not Detected     Parainfluenza Virus 4 Not Detected     RSV, PCR Not Detected     Bordetella pertussis pcr Not Detected     Bordetella parapertussis PCR Not Detected     Chlamydophila pneumoniae PCR Not Detected     Mycoplasma pneumo by PCR Not Detected    Narrative:      In the setting of a positive respiratory panel with a viral infection PLUS a negative procalcitonin without other underlying concern for bacterial infection, consider observing off antibiotics or discontinuation of antibiotics and continue supportive care. If the respiratory panel is positive for atypical bacterial infection (Bordetella pertussis, Chlamydophila pneumoniae, or Mycoplasma pneumoniae), consider antibiotic de-escalation to target atypical bacterial infection.    Blood Culture - Blood, Arm, Right [[id number]]  (Normal) Collected: 10/17/23 1650    Lab Status: Preliminary result Specimen: Blood from Arm, Right Updated: 10/18/23 1701      Blood Culture No growth at 24 hours    Narrative:      Less than seven (7) mL's of blood was collected.  Insufficient quantity may yield false negative results.             Imaging Results (Last 7 Days)       Procedure Component Value Units Date/Time    XR Chest 1 View [[id number]] Collected: 10/17/23 1638     Updated: 10/17/23 1647    Narrative:      XR CHEST 1 VW-     HISTORY: Female who is 73 years-old, altered mental status     TECHNIQUE: Frontal view of the chest     COMPARISON: None available     FINDINGS: The heart size is normal. Pulmonary vasculature is congested.  Old granulomatous disease is present. Aorta is calcified. Patchy density  at the right lower lung may be edema or developing pneumonia. No large  pleural effusion, or pneumothorax. No acute osseous process.       Impression:      Patchy density at the right lower lung may be edema or  developing pneumonia, follow-up recommended.      This report was finalized on 10/17/2023 4:44 PM by Dr. Isai Diaz M.D on Workstation: [id number]       CT Head Without Contrast [[id number]] Collected: 10/17/23 1630     Updated: 10/17/23 1637    Narrative:      CT HEAD WO CONTRAST-     INDICATIONS: Mental status change     TECHNIQUE: Radiation dose reduction techniques were utilized, including  automated exposure control and exposure modulation based on body size.  Noncontrast head CT     COMPARISON: None available     FINDINGS:           No acute intracranial hemorrhage, midline shift or mass effect. No acute  territorial infarct is identified.     Mild periventricular hypodensities suggest chronic small vessel ischemic  change in a patient this age.     Arterial calcifications are seen in the base of the brain.     Ventricles, cisterns, cerebral sulci are unremarkable for patient age.     The visualized paranasal sinuses, orbits, mastoid air cells are  unremarkable.                   Impression:         No acute intracranial hemorrhage or hydrocephalus. If  there is further  clinical concern, MRI could be considered for further evaluation.     This report was finalized on 10/17/2023 4:34 PM by Dr. Isai Diaz M.D on Workstation: [id number]                 Assessment:      Sepsis syndrome  Right lower lobe pneumonia  Severe hypernatremia  Dehydration  Elevated troponin, probably non-STEMI type II.  Thrombocytopenia  Lymphocytosis  Liver cirrhosis with splenomegaly   Bipolar disorder  Hypertension  Hypothyroidism  Depression  Diabetes mellitus  Vascular dementia without behavioral disturbance  Failure to thrive    Plan:    Unfortunately, patient mental status is not getting better.  Patient has advanced dementia and has been declining over the last few years at the nursing facility.  Patient has not been eating or drinking during the last few weeks at the nursing facility due to advanced dementia.  Patient also has advanced liver cirrhosis which is making her mental status even worse.  Patient has splenomegaly which is causing thrombocytopenia which has become severe due to the infection.  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  At this time, with all supportive measures provided, the patient is not demonstrating any significant improvement.  Patient has irreversible terminal conditions.  So continuation of care will be futile.  So I am recommending hospice care and comfort measures/end-of-life care, will consist on keeping patient comfortable, wound care, positioning, medications to relieve pain and suffering, use oxygen, suction for comfort, and let patient die in peace.  Further administration of antibiotics, IV fluids, artificial tube feedings, will not change her quality of life or her poor prognosis, so I am recommending to withhold/terminate these treatments.         Jai Monroy MD  10/19/2023  16:25 EDT

## 2023-10-19 NOTE — PLAN OF CARE
Goal Outcome Evaluation:  Plan of Care Reviewed With: patient        Progress: no change  Outcome Evaluation: Pt disorientedx4, in pain this shift, q2h turn. Morphine ordered by doctor spain for pain. Pt responded well. Bolus given to patient. Potassium replacement.

## 2023-10-19 NOTE — PLAN OF CARE
Goal Outcome Evaluation:              Outcome Evaluation: New orders received. Unable to arouse patient to participate in clinical swallow evaluation. Recommend continuing NPO status. Notified RN. Per chart review, MD highly recommending comfort measures. Speech to s/o at this time. Please re-consult as warranted.

## 2023-10-20 PROBLEM — E43 SEVERE MALNUTRITION: Status: ACTIVE | Noted: 2023-01-01

## 2023-10-20 NOTE — PROGRESS NOTES
REASON FOR FOLLOWUP/CHIEF COMPLAINT:  Lymphocytosis    HISTORY OF PRESENT ILLNESS:   Discussed with Dr. Gallegos.  Since she is not improving, he plans to transition to Palliative care.  Patient unable to provide history.  No new events on chart review.    Past Medical History, Past Surgical History, Social History, Family History have been reviewed and are without significant changes except as mentioned.    Review of Systems   Review of Systems   Unable to perform ROS: Patient nonverbal       Medications:  The current medication list was reviewed in the EMR    ALLERGIES:    Allergies   Allergen Reactions    Meperidine Hcl Nausea And Vomiting    Sumycin [Tetracycline]               Vitals:    10/20/23 0054 10/20/23 0100 10/20/23 0110 10/20/23 0735   BP:  102/46  115/47   BP Location:  Left arm  Right arm   Patient Position:  Lying  Lying   Pulse: 66  68 68   Resp: 16   16   Temp: 97.5 °F (36.4 °C)   98.7 °F (37.1 °C)   TempSrc: Oral   Oral   SpO2: 100%   100%   Weight:       Height:         Physical Exam    CONSTITUTIONAL:  Vital signs reviewed.  No distress, looks comfortable.  EYES:  Conjunctivae and lids unremarkable.  PERRLA  EARS, NOSE, MOUTH, THROAT:  Ears and nose appear unremarkable.  Lips, teeth, gums appear unremarkable.  RESPIRATORY:  Normal respiratory effort.  Lungs clear to auscultation bilaterally.  CARDIOVASCULAR:  Normal S1, S2.  No murmurs, rubs or gallops.  No significant lower extremity edema.  GASTROINTESTINAL: Abdomen appears unremarkable.  Nontender.  No hepatomegaly.  No splenomegaly.  NEURO: Unable to adequately assess as patient is confused.   MUSCULOSKELETAL:  Unremarkable digits/nails.  No cyanosis or clubbing.  SKIN:  Warm.  No rashes.  PSYCHIATRIC: Unable to adequately assess as patient is confused.       RECENT LABS:  WBC   Date Value Ref Range Status   10/20/2023 16.50 (H) 3.40 - 10.80 10*3/mm3 Final   10/19/2023 11.95 (H) 3.40 - 10.80 10*3/mm3 Final   10/18/2023 15.80 (H) 3.40  - 10.80 10*3/mm3 Final   10/17/2023 40.46 (C) 3.40 - 10.80 10*3/mm3 Final     Hemoglobin   Date Value Ref Range Status   10/20/2023 10.0 (L) 12.0 - 15.9 g/dL Final   10/19/2023 9.8 (L) 12.0 - 15.9 g/dL Final   10/18/2023 11.2 (L) 12.0 - 15.9 g/dL Final   10/17/2023 12.9 12.0 - 15.9 g/dL Final     Platelets   Date Value Ref Range Status   10/20/2023 19 (C) 140 - 450 10*3/mm3 Final   10/19/2023 20 (C) 140 - 450 10*3/mm3 Final   10/18/2023 28 (C) 140 - 450 10*3/mm3 Final   10/17/2023 47 (C) 140 - 450 10*3/mm3 Final       ASSESSMENT/PLAN:  Gloria Sands [id number]     *Thrombocytopenia  Likely a result of platelet sequestration due to splenomegaly  The platelet count was 54,000 at her initial visit and has been stable for a number of years  Recent platelets 45,000 on 7/12/2023  Outpatient Dr. Cortes appointment: 7/17/2023: Platelets stable at 51,000  Admission for sepsis syndrome and pneumonia: 10/17/2023: PLT 47, stable  10/19/2023: PLT 20, suspect the drop is due to sepsis/pneumonia  PLT 19     *Normocytic anemia  7/12/2023: Hemoglobin 9.1, lower than previous  7/17/2023: Hemoglobin 9.8, MCV higher at 103.1  10/17/2023: Hb up to 12.9.  This might be due to hemoconcentration and Hb might drop during this admission.  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  7/17/2023: The white blood cell count is higher at 14.89 with 23% neutrophils and 69% lymphocytes.  10/17/2023: WBC 40.5 with 80% lymphocytes.  With CLL, sometimes the WBC (and absolute lymphocyte count), significantly rises when someone is ill with something such as sepsis syndrome and pneumonia.  PLT is stable.  Increased WBC alone would not be an indication for chemotherapy.  We will monitor.  10/19/2023: WBC back down to 11.95 with ALC 9444 (79% lymphocytes)      *Paranoid schizophrenia     *Dementia     *Admitted 10/17/2023 for sepsis syndrome and pneumonia.  SBP was around 74 and O2 sat around 82% on room air prompting her nursing facility to send her to the ER, with subsequent admission.     Plan  Could transfuse platelets if needed.    Chart reviewed and summarized including Dr. Monroy note and Dr. Sarkar note.  Even if she has CLL, there is no indication to treat (and she is not a candidate for treatment).     I don't think I'm adding much at this point.  Will sign off.  Please call if I can be of any further assistance.  Thanks for the opportunity to help.

## 2023-10-20 NOTE — PLAN OF CARE
Goal Outcome Evaluation:  Plan of Care Reviewed With: patient   GCS 13; KCL 10meq replace per protocol continue; Moans in pain with all turns- administered tylenol AL ordered prn for pain. Reports called to Palliative care. Awaiting transport for transfer.

## 2023-10-20 NOTE — PROGRESS NOTES
"DAILY PROGRESS NOTE  [facility], [facility]    10/20/2023    Patient Identification:  Name: Gloria Sands  Age: 73 y.o.  Sex: female  :  1950  MRN: [id number]           Primary Care Physician: Jai Monroy MD    Subjective:    Interval History:    No much changes.  Patient does have pain with positioning.  On pain medication as needed.  Vital signs remained stable.  Potassium today is 2.9, on replacement protocol. Creatinine 0.45  Blood sugar in the mid 100s-low 200s.      ROS:     No report for nausea, vomiting, diarrhea, shortness of breath.      Objective:    Scheduled Meds:  aspirin, 300 mg, Rectal, Daily  cefepime, 2,000 mg, Intravenous, Q12H  insulin lispro, 2-7 Units, Subcutaneous, 4x Daily AC & at Bedtime  potassium chloride, 10 mEq, Intravenous, Q1H        Continuous Infusions:  dextrose, 125 mL/hr, Last Rate: 125 mL/hr (10/20/23 1417)        PRN Meds:    acetaminophen    dextrose    dextrose    glucagon (human recombinant)    Morphine    Potassium Replacement - Follow Nurse / BPA Driven Protocol    [COMPLETED] Insert Peripheral IV **AND** sodium chloride    Intake/Output:    Intake/Output Summary (Last 24 hours) at 10/20/2023 1443  Last data filed at 10/20/2023 0845  Gross per 24 hour   Intake 0 ml   Output --   Net 0 ml           Exam:    T MAX 24 hrs: Temp (24hrs), Av.4 °F (36.3 °C), Min:96.1 °F (35.6 °C), Max:98.7 °F (37.1 °C)    Vitals Ranges:   Temp:  [96.1 °F (35.6 °C)-98.7 °F (37.1 °C)] 96.1 °F (35.6 °C)  Heart Rate:  [63-68] 68  Resp:  [16-18] 18  BP: ()/(46-68) 90/68    BP 90/68 (BP Location: Right arm, Patient Position: Lying)   Pulse 68   Temp 96.1 °F (35.6 °C) (Axillary)   Resp 18   Ht 160 cm (63\")   Wt 40.5 kg (89 lb 4.8 oz)   SpO2 (!) 81%   BMI 15.82 kg/m²     General: Somnolent, arousable, very confused when aroused.  Cachectic.    Neck: Supple, symmetrical, trachea midline, no adenopathy;              thyroid:  no " enlargement/tenderness/nodules;              no carotid bruit or JVD  Cardiovascular: Normal rate, regular rhythm and intact distal pulses.              Exam reveals no gallop and no friction rub. No murmur heard  Pulmonary: Diminished breath sounds bilaterally, rhonchi bibasilarly.  No wheezing, no rales  Abdominal: Soft, splenomegaly.  Bowel sounds diminished.  No masses.    Extremities:  cachectic.  No edema.  Areas of ecchymosis.    Pulses: 1 + symmetric all extremities  Neurological: Patient is somnolent, arousable, but very confused when aroused.  No new focal sensorimotor deficit.    Skin: Skin color, texture, normal. Turgor is decreased. No rashes or lesions      Data Review:    Results from last 7 days   Lab Units 10/20/23  0627 10/19/23  0127 10/18/23  1004   WBC 10*3/mm3 16.50* 11.95* 15.80*   HEMOGLOBIN g/dL 10.0* 9.8* 11.2*   HEMATOCRIT % 27.9* 28.8* 32.9*   PLATELETS 10*3/mm3 19* 20* 28*       Results from last 7 days   Lab Units 10/20/23  0627 10/19/23  1535 10/19/23  0127 10/18/23  1004   SODIUM mmol/L 145  --  160* 166*   POTASSIUM mmol/L 2.9* 3.5 2.7* 2.7*   CHLORIDE mmol/L 117*  --  126* 133*   CO2 mmol/L 20.0*  --  22.0 22.5   BUN mg/dL 24*  --  31* 34*   CREATININE mg/dL 0.45*  --  0.52* 0.56*   CALCIUM mg/dL 7.4*  --  7.9* 8.5*   BILIRUBIN mg/dL  --   --   --  2.0*   ALK PHOS U/L  --   --   --  86   ALT (SGPT) U/L  --   --   --  9   AST (SGOT) U/L  --   --   --  29   GLUCOSE mg/dL 215*  --  167* 194*           Results from last 7 days   Lab Units 10/17/23  1555   HEMOGLOBIN A1C % 5.40       Lab Results   Lab Value Date/Time    TROPONINT 140 (C) 10/17/2023 2049    TROPONINT 136 (C) 10/17/2023 1555       Microbiology Results (last 10 days)       Procedure Component Value - Date/Time    MRSA Screen, PCR (Inpatient) - Swab, Nares [[id number]]  (Normal) Collected: 10/18/23 0106    Lab Status: Final result Specimen: Swab from Nares Updated: 10/18/23 0241     MRSA PCR No MRSA Detected    Narrative:       The negative predictive value of this diagnostic test is high and should only be used to consider de-escalating anti-MRSA therapy. A positive result may indicate colonization with MRSA and must be correlated clinically.    Blood Culture - Blood, Arm, Left [[id number]]  (Normal) Collected: 10/17/23 1837    Lab Status: Preliminary result Specimen: Blood from Arm, Left Updated: 10/19/23 1900     Blood Culture No growth at 2 days    Respiratory Panel PCR w/COVID-19(SARS-CoV-2) F[facility]RICIO/ROGER/CAMILLA/PAD/COR/MAD/ARELY In-House, NP Swab in UTM/VTM, 3-4 HR TAT - Swab, Nasopharynx [[id number]]  (Normal) Collected: 10/17/23 1740    Lab Status: Final result Specimen: Swab from Nasopharynx Updated: 10/17/23 1848     ADENOVIRUS, PCR Not Detected     Coronavirus 229E Not Detected     Coronavirus HKU1 Not Detected     Coronavirus NL63 Not Detected     Coronavirus OC43 Not Detected     COVID19 Not Detected     Human Metapneumovirus Not Detected     Human Rhinovirus/Enterovirus Not Detected     Influenza A PCR Not Detected     Influenza B PCR Not Detected     Parainfluenza Virus 1 Not Detected     Parainfluenza Virus 2 Not Detected     Parainfluenza Virus 3 Not Detected     Parainfluenza Virus 4 Not Detected     RSV, PCR Not Detected     Bordetella pertussis pcr Not Detected     Bordetella parapertussis PCR Not Detected     Chlamydophila pneumoniae PCR Not Detected     Mycoplasma pneumo by PCR Not Detected    Narrative:      In the setting of a positive respiratory panel with a viral infection PLUS a negative procalcitonin without other underlying concern for bacterial infection, consider observing off antibiotics or discontinuation of antibiotics and continue supportive care. If the respiratory panel is positive for atypical bacterial infection (Bordetella pertussis, Chlamydophila pneumoniae, or Mycoplasma pneumoniae), consider antibiotic de-escalation to target atypical bacterial infection.    Blood Culture - Blood, Arm, Right [[id number]]   (Normal) Collected: 10/17/23 1650    Lab Status: Preliminary result Specimen: Blood from Arm, Right Updated: 10/19/23 1701     Blood Culture No growth at 2 days    Narrative:      Less than seven (7) mL's of blood was collected.  Insufficient quantity may yield false negative results.             Imaging Results (Last 7 Days)       Procedure Component Value Units Date/Time    XR Chest 1 View [[id number]] Collected: 10/17/23 1638     Updated: 10/17/23 1647    Narrative:      XR CHEST 1 VW-     HISTORY: Female who is 73 years-old, altered mental status     TECHNIQUE: Frontal view of the chest     COMPARISON: None available     FINDINGS: The heart size is normal. Pulmonary vasculature is congested.  Old granulomatous disease is present. Aorta is calcified. Patchy density  at the right lower lung may be edema or developing pneumonia. No large  pleural effusion, or pneumothorax. No acute osseous process.       Impression:      Patchy density at the right lower lung may be edema or  developing pneumonia, follow-up recommended.      This report was finalized on 10/17/2023 4:44 PM by Dr. Isai Diaz M.D on Workstation: [id number]       CT Head Without Contrast [[id number]] Collected: 10/17/23 1630     Updated: 10/17/23 1637    Narrative:      CT HEAD WO CONTRAST-     INDICATIONS: Mental status change     TECHNIQUE: Radiation dose reduction techniques were utilized, including  automated exposure control and exposure modulation based on body size.  Noncontrast head CT     COMPARISON: None available     FINDINGS:           No acute intracranial hemorrhage, midline shift or mass effect. No acute  territorial infarct is identified.     Mild periventricular hypodensities suggest chronic small vessel ischemic  change in a patient this age.     Arterial calcifications are seen in the base of the brain.     Ventricles, cisterns, cerebral sulci are unremarkable for patient age.     The visualized paranasal sinuses, orbits, mastoid  air cells are  unremarkable.                   Impression:         No acute intracranial hemorrhage or hydrocephalus. If there is further  clinical concern, MRI could be considered for further evaluation.     This report was finalized on 10/17/2023 4:34 PM by Dr. Isai Diaz M.D on Workstation: [id number]                 Assessment:      Sepsis syndrome  Right lower lobe pneumonia  Severe hypernatremia  Dehydration  Elevated troponin, probably non-STEMI type II.  Thrombocytopenia  Lymphocytosis  Liver cirrhosis with splenomegaly   Bipolar disorder  Hypertension  Hypothyroidism  Depression  Diabetes mellitus  Vascular dementia without behavioral disturbance  Failure to thrive    Plan:    Patient is not improving.  Having more pain with positioning.  We will adjust pain medication.  No eating, not drinking.  We will continue supportive care including IV fluids, IV antibiotics, oxygen, mini nebs, etc.  Request for changing care structure to hospice care and comfort measures/end-of-life care has been submitted to state guardian.  Awaiting response.  As outlined yesterday, patient has a very poor prognosis and I am still recommending changing his care structure to hospice care and comfort measures/end-of-life care.    Jai Monroy MD  10/20/2023  14:43 EDT

## 2023-10-20 NOTE — CASE MANAGEMENT/SOCIAL WORK
Continued Stay Note  [facility]     Patient Name: Gloria Sands  MRN: [id number]  Today's Date: 10/20/2023    Admit Date: 10/17/2023    Plan: Comfort care   Discharge Plan       Row Name 10/20/23 1527       Plan    Plan Comfort care    Plan Comments Received notification from PAULINE office, patient is approved for end of life care, pt transferred to palliative care unit. Uploaded approval to HIM. Updated CM on Palliative unit, updated RN and MD. Updated patient legal guardian Brooklynn Leach, she is agreeable to patient receiving comfort care on palliative care unit. CCP will follow - Emma PINEDA                   Discharge Codes    No documentation.                 Expected Discharge Date and Time       Expected Discharge Date Expected Discharge Time    Oct 23, 2023               Emma Santos RN

## 2023-10-20 NOTE — PLAN OF CARE
Problem: Infection Progression (Sepsis/Septic Shock)  Goal: Absence of Infection Signs and Symptoms  Intervention: Initiate Sepsis Management  Recent Flowsheet Documentation  Taken 10/20/2023 0200 by Hakan Harley RN  Infection Prevention:   single patient room provided   rest/sleep promoted   personal protective equipment utilized   hand hygiene promoted   environmental surveillance performed  Isolation Precautions:   contact   precautions maintained  Taken 10/20/2023 0000 by Hakan Harley RN  Infection Prevention:   single patient room provided   rest/sleep promoted   personal protective equipment utilized   hand hygiene promoted   environmental surveillance performed  Isolation Precautions:   contact   precautions maintained  Taken 10/19/2023 2220 by Hakan Harley RN  Infection Prevention:   single patient room provided   rest/sleep promoted   personal protective equipment utilized   hand hygiene promoted   environmental surveillance performed  Isolation Precautions:   contact   precautions maintained  Taken 10/19/2023 2030 by Hakan Harley RN  Infection Prevention:   single patient room provided   rest/sleep promoted   personal protective equipment utilized   hand hygiene promoted   environmental surveillance performed  Isolation Precautions:   contact   precautions maintained     Problem: Nutrition Impaired (Sepsis/Septic Shock)  Goal: Optimal Nutrition Intake  10/20/2023 0249 by Hakan Harley RN  Outcome: Ongoing, Progressing  10/20/2023 0243 by Hakan Harley RN  Outcome: Ongoing, Progressing  10/20/2023 0243 by Hakan Harley RN  Outcome: Ongoing, Progressing     Problem: Skin Injury Risk Increased  Goal: Skin Health and Integrity  10/20/2023 0249 by Hakan Harley RN  Outcome: Ongoing, Progressing  10/20/2023 0243 by Hakan Harley RN  Outcome: Ongoing, Progressing  10/20/2023 0243 by Hakan Harley  RN  Outcome: Ongoing, Progressing  Intervention: Optimize Skin Protection  Recent Flowsheet Documentation  Taken 10/20/2023 0200 by Hakan Harley RN  Head of Bed (HOB) Positioning: HOB at 20-30 degrees  Taken 10/20/2023 0000 by Hakan Harley RN  Pressure Reduction Techniques:   frequent weight shift encouraged   heels elevated off bed   weight shift assistance provided   positioned off wounds  Head of Bed (HOB) Positioning: HOB at 20-30 degrees  Pressure Reduction Devices:   alternating pressure pump (ADD)   specialty bed utilized  Skin Protection:   adhesive use limited   incontinence pads utilized   tubing/devices free from skin contact  Taken 10/19/2023 2220 by Hakan Harley RN  Head of Bed (HOB) Positioning: HOB at 20-30 degrees  Taken 10/19/2023 2030 by Hakan Harley RN  Pressure Reduction Techniques:   frequent weight shift encouraged   weight shift assistance provided   heels elevated off bed   positioned off wounds  Head of Bed (HOB) Positioning: HOB at 20-30 degrees  Pressure Reduction Devices:   positioning supports utilized   heel offloading device utilized  Skin Protection:   adhesive use limited   incontinence pads utilized   tubing/devices free from skin contact     Problem: Adult Inpatient Plan of Care  Goal: Absence of Hospital-Acquired Illness or Injury  Intervention: Prevent Skin Injury  Recent Flowsheet Documentation  Taken 10/20/2023 0000 by Hakan Harley RN  Body Position:   turned   right   side-lying  Skin Protection:   adhesive use limited   incontinence pads utilized   tubing/devices free from skin contact  Taken 10/19/2023 2220 by Hakan Harley RN  Body Position:   left   side-lying  Taken 10/19/2023 2030 by Hakan Harley RN  Body Position: supine  Skin Protection:   adhesive use limited   incontinence pads utilized   tubing/devices free from skin contact   Goal Outcome Evaluation:  Plan of Care Reviewed With:  patient        Progress: no change  Outcome Evaluation: VSS, SR in telemetry monitor, room air, disoriented x4, urine and stool incontinent, on bedrest, q2 turning, on continues IV fluid infusion, maintained on NPO, bed alarm on, will continue to monitor pt.

## 2023-10-21 NOTE — PROGRESS NOTES
"DAILY PROGRESS NOTE  [facility], [facility]    10/21/2023    Patient Identification:  Name: Gloria Sands  Age: 73 y.o.  Sex: female  :  1950  MRN: [id number]           Primary Care Physician: Jai Monroy MD    Subjective:    Interval History:    Approval for end-of-life treatment received yesterday.  Patient transferred to our palliative care unit  On palliative care orders now.  Comfortable, declining.  Saturation 3 L is 81%, hypotensive.    Objective:    Scheduled Meds:         Continuous Infusions:         PRN Meds:    acetaminophen **OR** acetaminophen **OR** acetaminophen    diphenhydrAMINE **OR** diphenhydrAMINE    diphenoxylate-atropine    Glycerin-Hypromellose-    glycopyrrolate **OR** glycopyrrolate **OR** glycopyrrolate **OR** glycopyrrolate    HYDROmorphone **OR** Morphine **OR** morphine **OR** ketorolac    HYDROmorphone **OR** Morphine **OR** morphine    Lidocaine Viscous HCl    LORazepam **OR** LORazepam **OR** LORazepam    LORazepam **OR** LORazepam **OR** LORazepam    LORazepam **OR** LORazepam **OR** LORazepam    ondansetron **OR** ondansetron    [COMPLETED] Insert Peripheral IV **AND** sodium chloride    Intake/Output:    Intake/Output Summary (Last 24 hours) at 10/21/2023 0953  Last data filed at 10/21/2023 0514  Gross per 24 hour   Intake --   Output 450 ml   Net -450 ml           Exam:    T MAX 24 hrs: Temp (24hrs), Av.5 °F (35.8 °C), Min:96 °F (35.6 °C), Max:97.5 °F (36.4 °C)    Vitals Ranges:   Temp:  [96 °F (35.6 °C)-97.5 °F (36.4 °C)] 96 °F (35.6 °C)  Heart Rate:  [68-78] 75  Resp:  [16-18] 18  BP: ()/(54-69) 93/69    BP 93/69 (BP Location: Right arm, Patient Position: Lying)   Pulse 75   Temp 96 °F (35.6 °C) (Axillary)   Resp 18   Ht 160 cm (63\")   Wt 40.5 kg (89 lb 4.8 oz)   SpO2 (!) 81%   BMI 15.82 kg/m²     General: Unresponsive.  Cachectic.    Neck: Supple, symmetrical, trachea midline, no adenopathy;    "           thyroid:  no enlargement/tenderness/nodules;              no carotid bruit or JVD  Cardiovascular: Normal rate, regular rhythm and intact distal pulses.              Exam reveals no gallop and no friction rub. No murmur heard  Pulmonary: Diminished breath sounds bilaterally, rhonchi bibasilarly.  No wheezing, no rales  Abdominal: Soft, splenomegaly.  Bowel sounds diminished.  No masses.    Extremities:  cachectic.  No edema.  Areas of ecchymosis.    Pulses: 1 + symmetric all extremities  Neurological: Patient is unresponsive.  No new focal sensorimotor deficit.    Skin: Skin color, texture, normal. Turgor is decreased. No rashes or lesions      Data Review:    Results from last 7 days   Lab Units 10/20/23  0627 10/19/23  0127 10/18/23  1004   WBC 10*3/mm3 16.50* 11.95* 15.80*   HEMOGLOBIN g/dL 10.0* 9.8* 11.2*   HEMATOCRIT % 27.9* 28.8* 32.9*   PLATELETS 10*3/mm3 19* 20* 28*       Results from last 7 days   Lab Units 10/20/23  0627 10/19/23  1535 10/19/23  0127 10/18/23  1004   SODIUM mmol/L 145  --  160* 166*   POTASSIUM mmol/L 2.9* 3.5 2.7* 2.7*   CHLORIDE mmol/L 117*  --  126* 133*   CO2 mmol/L 20.0*  --  22.0 22.5   BUN mg/dL 24*  --  31* 34*   CREATININE mg/dL 0.45*  --  0.52* 0.56*   CALCIUM mg/dL 7.4*  --  7.9* 8.5*   BILIRUBIN mg/dL  --   --   --  2.0*   ALK PHOS U/L  --   --   --  86   ALT (SGPT) U/L  --   --   --  9   AST (SGOT) U/L  --   --   --  29   GLUCOSE mg/dL 215*  --  167* 194*           Results from last 7 days   Lab Units 10/17/23  1555   HEMOGLOBIN A1C % 5.40       Lab Results   Lab Value Date/Time    TROPONINT 140 (C) 10/17/2023 2049    TROPONINT 136 (C) 10/17/2023 1555       Microbiology Results (last 10 days)       Procedure Component Value - Date/Time    MRSA Screen, PCR (Inpatient) - Swab, Nares [[id number]]  (Normal) Collected: 10/18/23 0106    Lab Status: Final result Specimen: Swab from Nares Updated: 10/18/23 0241     MRSA PCR No MRSA Detected    Narrative:      The negative  predictive value of this diagnostic test is high and should only be used to consider de-escalating anti-MRSA therapy. A positive result may indicate colonization with MRSA and must be correlated clinically.    Blood Culture - Blood, Arm, Left [[id number]]  (Normal) Collected: 10/17/23 1837    Lab Status: Preliminary result Specimen: Blood from Arm, Left Updated: 10/20/23 1901     Blood Culture No growth at 3 days    Respiratory Panel PCR w/COVID-19(SARS-CoV-2) F[facility]RICIO/ROGER/CAMILLA/PAD/COR/MAD/ARELY In-House, NP Swab in UTM/VTM, 3-4 HR TAT - Swab, Nasopharynx [[id number]]  (Normal) Collected: 10/17/23 1740    Lab Status: Final result Specimen: Swab from Nasopharynx Updated: 10/17/23 1848     ADENOVIRUS, PCR Not Detected     Coronavirus 229E Not Detected     Coronavirus HKU1 Not Detected     Coronavirus NL63 Not Detected     Coronavirus OC43 Not Detected     COVID19 Not Detected     Human Metapneumovirus Not Detected     Human Rhinovirus/Enterovirus Not Detected     Influenza A PCR Not Detected     Influenza B PCR Not Detected     Parainfluenza Virus 1 Not Detected     Parainfluenza Virus 2 Not Detected     Parainfluenza Virus 3 Not Detected     Parainfluenza Virus 4 Not Detected     RSV, PCR Not Detected     Bordetella pertussis pcr Not Detected     Bordetella parapertussis PCR Not Detected     Chlamydophila pneumoniae PCR Not Detected     Mycoplasma pneumo by PCR Not Detected    Narrative:      In the setting of a positive respiratory panel with a viral infection PLUS a negative procalcitonin without other underlying concern for bacterial infection, consider observing off antibiotics or discontinuation of antibiotics and continue supportive care. If the respiratory panel is positive for atypical bacterial infection (Bordetella pertussis, Chlamydophila pneumoniae, or Mycoplasma pneumoniae), consider antibiotic de-escalation to target atypical bacterial infection.    Blood Culture - Blood, Arm, Right [[id number]]  (Normal)  Collected: 10/17/23 1650    Lab Status: Preliminary result Specimen: Blood from Arm, Right Updated: 10/20/23 1701     Blood Culture No growth at 3 days    Narrative:      Less than seven (7) mL's of blood was collected.  Insufficient quantity may yield false negative results.             Imaging Results (Last 7 Days)       Procedure Component Value Units Date/Time    XR Chest 1 View [[id number]] Collected: 10/17/23 1638     Updated: 10/17/23 1647    Narrative:      XR CHEST 1 VW-     HISTORY: Female who is 73 years-old, altered mental status     TECHNIQUE: Frontal view of the chest     COMPARISON: None available     FINDINGS: The heart size is normal. Pulmonary vasculature is congested.  Old granulomatous disease is present. Aorta is calcified. Patchy density  at the right lower lung may be edema or developing pneumonia. No large  pleural effusion, or pneumothorax. No acute osseous process.       Impression:      Patchy density at the right lower lung may be edema or  developing pneumonia, follow-up recommended.      This report was finalized on 10/17/2023 4:44 PM by Dr. Isai Diaz M.D on Workstation: [id number]       CT Head Without Contrast [[id number]] Collected: 10/17/23 1630     Updated: 10/17/23 1637    Narrative:      CT HEAD WO CONTRAST-     INDICATIONS: Mental status change     TECHNIQUE: Radiation dose reduction techniques were utilized, including  automated exposure control and exposure modulation based on body size.  Noncontrast head CT     COMPARISON: None available     FINDINGS:           No acute intracranial hemorrhage, midline shift or mass effect. No acute  territorial infarct is identified.     Mild periventricular hypodensities suggest chronic small vessel ischemic  change in a patient this age.     Arterial calcifications are seen in the base of the brain.     Ventricles, cisterns, cerebral sulci are unremarkable for patient age.     The visualized paranasal sinuses, orbits, mastoid air  cells are  unremarkable.                   Impression:         No acute intracranial hemorrhage or hydrocephalus. If there is further  clinical concern, MRI could be considered for further evaluation.     This report was finalized on 10/17/2023 4:34 PM by Dr. Isai Diaz M.D on Workstation: [id number]                 Assessment:      Sepsis syndrome  Right lower lobe pneumonia  Severe hypernatremia  Dehydration  Elevated troponin, probably non-STEMI type II.  Thrombocytopenia  Lymphocytosis  Liver cirrhosis with splenomegaly   Bipolar disorder  Hypertension  Hypothyroidism  Depression  Diabetes mellitus  Vascular dementia without behavioral disturbance  Failure to thrive    Plan:    Patient transferred to our palliative care unit for end-of-life therapy, approved per state guardian.  Continue palliative care orders  Patient is comfortable  No family at bedside  Declining      Jai Monroy MD  10/21/2023  09:53 EDT

## 2023-10-21 NOTE — PLAN OF CARE
Goal Outcome Evaluation:  Plan of Care Reviewed With: patient        Progress: declining  Outcome Evaluation: Medicated with ativan and morphine x3 for signs of discomfort including grimace, moaning, and restlessness. Increasing secretions, added robinul. Turns every 4 hours. Will continue palliative care.

## 2023-10-21 NOTE — PLAN OF CARE
Goal Outcome Evaluation:  Plan of Care Reviewed With: patient        Progress: declining  Outcome Evaluation: PPS 10%. Pt was medicated with 0.5mg of dilaudid, 1mg of ativan and 0.4mg of robinul prior to turns. 3L NC. Fernando present. No family at bedside. No needs at this time.

## 2023-10-22 NOTE — PLAN OF CARE
Goal Outcome Evaluation:  Plan of Care Reviewed With: patient        Progress: declining  Outcome Evaluation: Medicated with ativan, dilaudid, and robinul every 4 hours prior to turns. Appears comfortable. Will continue palliative care.

## 2023-10-22 NOTE — PLAN OF CARE
Goal Outcome Evaluation:  Plan of Care Reviewed With: patient        Progress: declining  Outcome Evaluation: PPS 10%. Pt was medicated with 0.5mg of dilaudid, 1mg of ativan, and 0.4mg of robinul prior to turns. 3L NC. Fernando present. No family at bedside. No needs at this time.

## 2023-10-22 NOTE — PROGRESS NOTES
Enter Query Response Below      Query Response: Severe Malnutrition              If applicable, please update the problem list.   Patient: Gloria Sands        : 1950  Account: [id number]           Admit Date:         How to Respond to this query:       a. Click New Note     b. Answer query within the yellow box.                c. Update the Problem List, if applicable.      If you have any questions about this query contact me at: [email or web address]     Dr. Monroy,     73yr female noted with body mass index of 15.85. Registered Dietitian consulted and reflects severe insufficient energy intake, severe muscle and fat loss with severe malnutrition noted on the malnutrition severity assessment sheet.     After study, please clarify diagnosis treated/monitored:    Severe Malnutrition   Malnutrition  Underweight  Other- specify __________  Unable to determine       By submitting this query, we are merely seeking further clarification of documentation to accurately reflect all conditions that you are monitoring, evaluating, treating or that extend the hospitalization or utilize additional resources of care. Please utilize your independent clinical judgment when addressing the question(s) above.     This query and your response, once completed, will be entered into the legal medical record.    Sincerely,  Kenya DAY Rn  Clinical Documentation Integrity Program

## 2023-10-22 NOTE — PROGRESS NOTES
"DAILY PROGRESS NOTE  [facility], [facility]    10/22/2023    Patient Identification:  Name: Gloria Sands  Age: 73 y.o.  Sex: female  :  1950  MRN: [id number]           Primary Care Physician: Jai Monroy MD    Subjective:    Interval History:    Patient was transferred to our palliative care unit yesterday for comfort care/end-of-life therapy.  On palliative care orders now.  Comfortable, declining.  Saturation on 3 L is 82%.    Objective:    Scheduled Meds:         Continuous Infusions:         PRN Meds:    acetaminophen **OR** acetaminophen **OR** acetaminophen    diphenhydrAMINE **OR** diphenhydrAMINE    diphenoxylate-atropine    Glycerin-Hypromellose-    glycopyrrolate **OR** glycopyrrolate **OR** glycopyrrolate **OR** glycopyrrolate    HYDROmorphone **OR** Morphine **OR** morphine **OR** ketorolac    HYDROmorphone **OR** Morphine **OR** morphine    Lidocaine Viscous HCl    LORazepam **OR** LORazepam **OR** LORazepam    LORazepam **OR** LORazepam **OR** LORazepam    LORazepam **OR** LORazepam **OR** LORazepam    ondansetron **OR** ondansetron    [COMPLETED] Insert Peripheral IV **AND** sodium chloride    Intake/Output:    Intake/Output Summary (Last 24 hours) at 10/22/2023 0845  Last data filed at 10/22/2023 0300  Gross per 24 hour   Intake --   Output 300 ml   Net -300 ml           Exam:    T MAX 24 hrs: Temp (24hrs), Av.3 °F (36.3 °C), Min:97.1 °F (36.2 °C), Max:97.4 °F (36.3 °C)    Vitals Ranges:   Temp:  [97.1 °F (36.2 °C)-97.4 °F (36.3 °C)] 97.1 °F (36.2 °C)  Heart Rate:  [100-104] 100  Resp:  [16] 16  BP: ()/(62-64) 99/64    BP 99/64 (BP Location: Left arm, Patient Position: Lying)   Pulse 100   Temp 97.1 °F (36.2 °C) (Axillary)   Resp 16   Ht 160 cm (63\")   Wt 40.5 kg (89 lb 4.8 oz)   SpO2 (!) 82%   BMI 15.82 kg/m²     General: Unresponsive.  Cachectic.    Neck: Supple, symmetrical, trachea midline, no adenopathy;          "     thyroid:  no enlargement/tenderness/nodules;              no carotid bruit or JVD  Cardiovascular: Normal rate, regular rhythm and intact distal pulses.              Exam reveals no gallop and no friction rub. No murmur heard  Pulmonary: Diminished breath sounds bilaterally, rhonchi bibasilarly.  No wheezing, no rales  Abdominal: Soft, splenomegaly.  Bowel sounds diminished.  No masses.    Extremities:  cachectic.  No edema.  Areas of ecchymosis.    Pulses: 1 + symmetric all extremities  Neurological: Patient is unresponsive.  No new focal sensorimotor deficit.    Skin: Skin color, texture, normal. Turgor is decreased. No rashes or lesions      Data Review:    Results from last 7 days   Lab Units 10/20/23  0627 10/19/23  0127 10/18/23  1004   WBC 10*3/mm3 16.50* 11.95* 15.80*   HEMOGLOBIN g/dL 10.0* 9.8* 11.2*   HEMATOCRIT % 27.9* 28.8* 32.9*   PLATELETS 10*3/mm3 19* 20* 28*       Results from last 7 days   Lab Units 10/20/23  0627 10/19/23  1535 10/19/23  0127 10/18/23  1004   SODIUM mmol/L 145  --  160* 166*   POTASSIUM mmol/L 2.9* 3.5 2.7* 2.7*   CHLORIDE mmol/L 117*  --  126* 133*   CO2 mmol/L 20.0*  --  22.0 22.5   BUN mg/dL 24*  --  31* 34*   CREATININE mg/dL 0.45*  --  0.52* 0.56*   CALCIUM mg/dL 7.4*  --  7.9* 8.5*   BILIRUBIN mg/dL  --   --   --  2.0*   ALK PHOS U/L  --   --   --  86   ALT (SGPT) U/L  --   --   --  9   AST (SGOT) U/L  --   --   --  29   GLUCOSE mg/dL 215*  --  167* 194*           Results from last 7 days   Lab Units 10/17/23  1555   HEMOGLOBIN A1C % 5.40       Lab Results   Lab Value Date/Time    TROPONINT 140 (C) 10/17/2023 2049    TROPONINT 136 (C) 10/17/2023 1555       Microbiology Results (last 10 days)       Procedure Component Value - Date/Time    CANDIDA AURIS SCREEN - Swab, Axilla Right, Axilla Left and Groin [[id number]]  (Normal) Collected: 10/20/23 1346    Lab Status: Preliminary result Specimen: Swab from Axilla Right, Axilla Left and Groin Updated: 10/21/23 1401     Candida  Auris Screen Culture No Candida auris isolated at 24 hours    MRSA Screen, PCR (Inpatient) - Swab, Nares [[id number]]  (Normal) Collected: 10/18/23 0106    Lab Status: Final result Specimen: Swab from Nares Updated: 10/18/23 0241     MRSA PCR No MRSA Detected    Narrative:      The negative predictive value of this diagnostic test is high and should only be used to consider de-escalating anti-MRSA therapy. A positive result may indicate colonization with MRSA and must be correlated clinically.    Blood Culture - Blood, Arm, Left [[id number]]  (Normal) Collected: 10/17/23 1837    Lab Status: Preliminary result Specimen: Blood from Arm, Left Updated: 10/21/23 1901     Blood Culture No growth at 4 days    Respiratory Panel PCR w/COVID-19(SARS-CoV-2) FABRICIO/ROGER/CAMILLA/PAD/COR/MAD/ARELY In-House, NP Swab in UTM/VTM, 3-4 HR TAT - Swab, Nasopharynx [[id number]]  (Normal) Collected: 10/17/23 1740    Lab Status: Final result Specimen: Swab from Nasopharynx Updated: 10/17/23 1848     ADENOVIRUS, PCR Not Detected     Coronavirus 229E Not Detected     Coronavirus HKU1 Not Detected     Coronavirus NL63 Not Detected     Coronavirus OC43 Not Detected     COVID19 Not Detected     Human Metapneumovirus Not Detected     Human Rhinovirus/Enterovirus Not Detected     Influenza A PCR Not Detected     Influenza B PCR Not Detected     Parainfluenza Virus 1 Not Detected     Parainfluenza Virus 2 Not Detected     Parainfluenza Virus 3 Not Detected     Parainfluenza Virus 4 Not Detected     RSV, PCR Not Detected     Bordetella pertussis pcr Not Detected     Bordetella parapertussis PCR Not Detected     Chlamydophila pneumoniae PCR Not Detected     Mycoplasma pneumo by PCR Not Detected    Narrative:      In the setting of a positive respiratory panel with a viral infection PLUS a negative procalcitonin without other underlying concern for bacterial infection, consider observing off antibiotics or discontinuation of antibiotics and continue supportive  care. If the respiratory panel is positive for atypical bacterial infection (Bordetella pertussis, Chlamydophila pneumoniae, or Mycoplasma pneumoniae), consider antibiotic de-escalation to target atypical bacterial infection.    Blood Culture - Blood, Arm, Right [[id number]]  (Normal) Collected: 10/17/23 1650    Lab Status: Preliminary result Specimen: Blood from Arm, Right Updated: 10/21/23 1701     Blood Culture No growth at 4 days    Narrative:      Less than seven (7) mL's of blood was collected.  Insufficient quantity may yield false negative results.             Imaging Results (Last 7 Days)       Procedure Component Value Units Date/Time    XR Chest 1 View [[id number]] Collected: 10/17/23 1638     Updated: 10/17/23 1647    Narrative:      XR CHEST 1 VW-     HISTORY: Female who is 73 years-old, altered mental status     TECHNIQUE: Frontal view of the chest     COMPARISON: None available     FINDINGS: The heart size is normal. Pulmonary vasculature is congested.  Old granulomatous disease is present. Aorta is calcified. Patchy density  at the right lower lung may be edema or developing pneumonia. No large  pleural effusion, or pneumothorax. No acute osseous process.       Impression:      Patchy density at the right lower lung may be edema or  developing pneumonia, follow-up recommended.      This report was finalized on 10/17/2023 4:44 PM by Dr. Isai Diaz M.D on Workstation: [id number]       CT Head Without Contrast [[id number]] Collected: 10/17/23 1630     Updated: 10/17/23 1637    Narrative:      CT HEAD WO CONTRAST-     INDICATIONS: Mental status change     TECHNIQUE: Radiation dose reduction techniques were utilized, including  automated exposure control and exposure modulation based on body size.  Noncontrast head CT     COMPARISON: None available     FINDINGS:           No acute intracranial hemorrhage, midline shift or mass effect. No acute  territorial infarct is identified.     Mild  periventricular hypodensities suggest chronic small vessel ischemic  change in a patient this age.     Arterial calcifications are seen in the base of the brain.     Ventricles, cisterns, cerebral sulci are unremarkable for patient age.     The visualized paranasal sinuses, orbits, mastoid air cells are  unremarkable.                   Impression:         No acute intracranial hemorrhage or hydrocephalus. If there is further  clinical concern, MRI could be considered for further evaluation.     This report was finalized on 10/17/2023 4:34 PM by Dr. Isai Diaz M.D on Workstation: [id number]                 Assessment:      Sepsis syndrome  Right lower lobe pneumonia  Severe hypernatremia  Dehydration  Elevated troponin, probably non-STEMI type II.  Thrombocytopenia  Lymphocytosis  Liver cirrhosis with splenomegaly   Bipolar disorder  Hypertension  Hypothyroidism  Depression  Diabetes mellitus  Vascular dementia without behavioral disturbance  Failure to thrive    Plan:    Continue comfort care/end-of-life therapy.  Continue palliative care unit orders  Hospice consulted.  Declining      Jai Monroy MD  10/22/2023  08:45 EDT

## 2023-10-23 NOTE — PLAN OF CARE
Goal Outcome Evaluation:  Plan of Care Reviewed With: patient        Progress: no change  Outcome Evaluation: Patient with no changes this shift. Patient given 0.5mg IV Dilaudid, 1mg IV Ativan and 0.4mg IV Robinul approximately every 4 hours before repositioning and patient has remained appearing comfortable. Patient with moderate serosanguinous weeping from Bilateral upper extremeties, wrapped with absorbant pads. No visitors overnight. Will continue comfort measures.

## 2023-10-23 NOTE — CONSULTS
Visit with patient at bedside. Patient unresponsive and appeared comfortable. Prayer offered for comfort and peace of patient and her family. There was no family present at time of visit.

## 2023-10-23 NOTE — PROGRESS NOTES
"DAILY PROGRESS NOTE  [facility], [facility]    10/23/2023    Patient Identification:  Name: Gloria Sands  Age: 73 y.o.  Sex: female  :  1950  MRN: [id number]           Primary Care Physician: Jai Monroy MD    Subjective:    Interval History:    Patient is declining, however, she looks comfortable.  Blood pressure declining.  On palliative care orders now.    Objective:    Scheduled Meds:         Continuous Infusions:         PRN Meds:    acetaminophen **OR** acetaminophen **OR** acetaminophen    diphenhydrAMINE **OR** diphenhydrAMINE    diphenoxylate-atropine    Glycerin-Hypromellose-    glycopyrrolate **OR** glycopyrrolate **OR** glycopyrrolate **OR** glycopyrrolate    HYDROmorphone **OR** Morphine **OR** morphine **OR** ketorolac    HYDROmorphone **OR** Morphine **OR** morphine    Lidocaine Viscous HCl    LORazepam **OR** LORazepam **OR** LORazepam    LORazepam **OR** LORazepam **OR** LORazepam    LORazepam **OR** LORazepam **OR** LORazepam    ondansetron **OR** ondansetron    [COMPLETED] Insert Peripheral IV **AND** sodium chloride    Intake/Output:    Intake/Output Summary (Last 24 hours) at 10/23/2023 1357  Last data filed at 10/23/2023 0455  Gross per 24 hour   Intake 0 ml   Output 175 ml   Net -175 ml           Exam:    T MAX 24 hrs: Temp (24hrs), Av.4 °F (36.9 °C), Min:98.4 °F (36.9 °C), Max:98.4 °F (36.9 °C)    Vitals Ranges:   Temp:  [98.4 °F (36.9 °C)] 98.4 °F (36.9 °C)  Heart Rate:  [77-86] 77  Resp:  [8-16] 8  BP: ()/(45-62) 83/45    BP (!) 83/45 (BP Location: Left arm, Patient Position: Lying)   Pulse 77   Temp 98.4 °F (36.9 °C) (Axillary)   Resp 8   Ht 160 cm (63\")   Wt 40.5 kg (89 lb 4.8 oz)   SpO2 98%   BMI 15.82 kg/m²     General: Unresponsive.  Cachectic.    Neck: Supple, symmetrical, trachea midline, no adenopathy;              thyroid:  no enlargement/tenderness/nodules;              no carotid bruit or " JVD  Cardiovascular: Normal rate, regular rhythm and intact distal pulses.              Exam reveals no gallop and no friction rub. No murmur heard  Pulmonary: Diminished breath sounds bilaterally, rhonchi bibasilarly.  No wheezing, no rales  Abdominal: Soft, splenomegaly.  Bowel sounds diminished.  No masses.    Extremities:  cachectic.  No edema.  Areas of ecchymosis.    Pulses: 1 + symmetric all extremities  Neurological: Patient is unresponsive.  No new focal sensorimotor deficit.    Skin: Skin color, texture, normal. Turgor is decreased. No rashes or lesions        Assessment:    Patient continues to decline.  Sepsis syndrome  Right lower lobe pneumonia  Severe hypernatremia  Dehydration  Elevated troponin, probably non-STEMI type II.  Thrombocytopenia  Lymphocytosis  Liver cirrhosis with splenomegaly   Bipolar disorder  Hypertension  Hypothyroidism  Depression  Diabetes mellitus  Vascular dementia without behavioral disturbance  Failure to thrive    Plan:    Patient continues to decline, however, she looks comfortable.  Continue comfort care/end-of-life therapy.  Continue palliative care unit orders  Hospice consulted.        Jai Monroy MD  10/23/2023  13:57 EDT

## 2023-10-23 NOTE — PLAN OF CARE
Goal Outcome Evaluation:           Progress: no change  Outcome Evaluation: Pt unresponsive. Premedicate prior to turns with 0.5mg dilaudid, 1mg ativan and 0.4mg robinul. Wound care provided on legs. Bilat upper arms are weeping. No family at bedside. Will cont to monitor

## 2023-10-24 NOTE — PROGRESS NOTES
"DAILY PROGRESS NOTE  [facility], [facility]    10/24/2023    Patient Identification:  Name: Gloria Sands  Age: 73 y.o.  Sex: female  :  1950  MRN: [id number]           Primary Care Physician: Jai Monroy MD    Subjective:    Interval History:    Patient is comfortable, but declining  Blood pressure in the 60's systolic  On palliative care orders now.    Objective:    Scheduled Meds:         Continuous Infusions:         PRN Meds:    acetaminophen **OR** acetaminophen **OR** acetaminophen    diphenhydrAMINE **OR** diphenhydrAMINE    diphenoxylate-atropine    Glycerin-Hypromellose-    glycopyrrolate **OR** glycopyrrolate **OR** glycopyrrolate **OR** glycopyrrolate    HYDROmorphone **OR** Morphine **OR** morphine **OR** ketorolac    HYDROmorphone **OR** Morphine **OR** morphine    Lidocaine Viscous HCl    LORazepam **OR** LORazepam **OR** LORazepam    LORazepam **OR** LORazepam **OR** LORazepam    LORazepam **OR** LORazepam **OR** LORazepam    ondansetron **OR** ondansetron    [COMPLETED] Insert Peripheral IV **AND** sodium chloride    Intake/Output:    Intake/Output Summary (Last 24 hours) at 10/24/2023 1130  Last data filed at 10/24/2023 0427  Gross per 24 hour   Intake 0 ml   Output 100 ml   Net -100 ml           Exam:    T MAX 24 hrs: No data recorded.    Vitals Ranges:   Heart Rate:  [62-73] 62  Resp:  [8] 8  BP: (67-89)/(40-43) 67/40    BP (!) 67/40 (BP Location: Left arm, Patient Position: Lying)   Pulse 62   Temp 98.4 °F (36.9 °C) (Axillary)   Resp 8   Ht 160 cm (63\")   Wt 40.5 kg (89 lb 4.8 oz)   SpO2 92%   BMI 15.82 kg/m²     General: Unresponsive.  Cachectic.    Neck: Supple, symmetrical, trachea midline, no adenopathy;              thyroid:  no enlargement/tenderness/nodules;              no carotid bruit or JVD  Cardiovascular: Normal rate, regular rhythm and intact distal pulses.              Exam reveals no gallop and no friction " rub. No murmur heard  Pulmonary: Diminished breath sounds bilaterally, rhonchi bibasilarly.  No wheezing, no rales  Abdominal: Soft, splenomegaly.  Bowel sounds diminished.  No masses.    Extremities:  cachectic.  No edema.  Areas of ecchymosis.    Pulses: 1 + symmetric all extremities  Neurological: Patient is unresponsive.  No new focal sensorimotor deficit.    Skin: Skin color, texture, normal. Turgor is decreased. No rashes or lesions        Assessment:    Sepsis syndrome  Right lower lobe pneumonia  Severe hypernatremia  Dehydration  Elevated troponin, probably non-STEMI type II.  Thrombocytopenia  Lymphocytosis  Liver cirrhosis with splenomegaly   Bipolar disorder  Hypertension  Hypothyroidism  Depression  Diabetes mellitus  Vascular dementia without behavioral disturbance  Failure to thrive    Plan:    Patient continues to decline. Continue comfort care/end-of-life therapy.  Comfortable  Hospice consulted for HSB.        Jai Monroy MD  10/24/2023  11:30 EDT

## 2023-10-24 NOTE — DISCHARGE SUMMARY
PHYSICIAN DISCHARGE SUMMARY  [facility], [facility]    Patient Identification:    Name: Gloria Sands  Age: 73 y.o.  Sex: female  :  1950  MRN: [id number]    Primary Care Physician: Jai Monroy MD    Admit date: 10/17/2023    Discharge date and time:10/24/2023    Discharged Condition: critical    Discharge Diagnoses:    Sepsis syndrome  Right lower lobe pneumonia  Severe hypernatremia  Dehydration  Elevated troponin, probably non-STEMI type II.  Thrombocytopenia  Lymphocytosis  Liver cirrhosis with splenomegaly   Bipolar disorder  Hypertension  Hypothyroidism  Depression  Diabetes mellitus  Vascular dementia without behavioral disturbance  Failure to thrive         Hospital Course: Gloria Sands  is a Patient is a 73-year-old female, resident at the nursing facility.  Patient has history of liver cirrhosis, failure to thrive, bipolar disorder, bilateral macular degeneration, hypertension, hypothyroidism, psychotic disorder with hallucinations, diabetes mellitus, malnourishment, vascular dementia with behavioral disturbance.  Earlier today, patient was found to have blood pressure of 74/60, tachycardic, oxygen saturation was 82% on room air.  Patient was sent to the emergency room, in the ER, patient was found to have: Creatinine 0.65, sodium 75, WBC 14.46, hemoglobin 12.8, platelets 47, troponin 136, initial 3.0, 84.60, BNP 1477, chest x-ray show patchy densities in the right lower lung, CT of head showed no acute intracranial hemorrhage hydrocephalus.  Patient was admitted to the hospital for further management.     Upon admission, patient was given IV fluids since patient was septic and had pneumonia and had hyponatremia.  Patient was given IV antibiotics to cover sepsis and pneumonia.  Cardiology was consulted for possible NSTEMI, hematology was consulted for possible CLL.  Patient was encephalopathic, lethargic and confused when aroused.  Patient was  "placed on DVT and stress ulcer prophylaxis.  Recommendations from all consultants were followed up.  Since patient did not show improvement despite  adequate treatment, and due to her multiple comorbidities, it was felt that her prognosis was very poor.  So it was recommended hospice care and comfort measures/end-of-life care, this was requested to the state guardian.  This request was approved on October 21.  Patient was transferred to our palliative care unit and was placed on palliative care orders for comfort care/end-of-life therapy.  Hospice was consulted to see whether patient qualifies for hospice scattered bed.  Patient did qualify.  Patient will be discharged and then readmitted as hospice scattered bed.  Patient will be continued on palliative care orders and focus of treatment will be on end-of-life/comfort care treatment.    PMHX:   Past Medical History:   Diagnosis Date    Arthritis     B12 deficiency     Cirrhosis     Diabetes mellitus     Hypertension     Hypothyroidism     Leukopenia     Lymphocytic leukemia     B cell    Myelodysplasia (myelodysplastic syndrome)     Splenomegaly     Thrombocytopenia      PSHX:   Past Surgical History:   Procedure Laterality Date    BONE MARROW BIOPSY W/ ASPIRATION  10/15/2012    HYSTERECTOMY      LUMBAR SPINE SURGERY      OTHER SURGICAL HISTORY      MULTIPLE SURGERIES FOR PAIN MANAGEMENT APPLIANCES           Consults:     Consults       Date and Time Order Name Status Description    10/18/2023 12:24 AM Hematology & Oncology Inpatient Consult Completed     10/17/2023  9:52 PM Inpatient Cardiology Consult Completed     10/17/2023  5:11 PM IP General Consult (Use specialty-specific consult if known)              Discharge Exam:    BP (!) 67/40 (BP Location: Left arm, Patient Position: Lying)   Pulse (!) 159   Temp 98.4 °F (36.9 °C) (Axillary)   Resp 8   Ht 160 cm (63\")   Wt 40.5 kg (89 lb 4.8 oz)   SpO2 (!) 83%   BMI 15.82 kg/m²     General: Unresponsive.  " Cachectic.    Neck: Supple, symmetrical, trachea midline, no adenopathy;              thyroid:  no enlargement/tenderness/nodules;              no carotid bruit or JVD  Cardiovascular: Normal rate, regular rhythm and intact distal pulses.              Exam reveals no gallop and no friction rub. No murmur heard  Pulmonary: Diminished breath sounds bilaterally, rhonchi bibasilarly.  No wheezing, no rales  Abdominal: Soft, splenomegaly.  Bowel sounds diminished.  No masses.    Extremities:  cachectic.  No edema.  Areas of ecchymosis.    Pulses: 1 + symmetric all extremities  Neurological: Patient is unresponsive.  No new focal sensorimotor deficit.    Skin: Skin color, texture, normal. Turgor is decreased. No rashes or lesions       Data Review:        Results from last 7 days   Lab Units 10/20/23  0627 10/19/23  0127 10/18/23  1004   WBC 10*3/mm3 16.50* 11.95* 15.80*   HEMOGLOBIN g/dL 10.0* 9.8* 11.2*   HEMATOCRIT % 27.9* 28.8* 32.9*   PLATELETS 10*3/mm3 19* 20* 28*       Results from last 7 days   Lab Units 10/20/23  0627 10/19/23  1535 10/19/23  0127 10/18/23  1004   SODIUM mmol/L 145  --  160* 166*   POTASSIUM mmol/L 2.9* 3.5 2.7* 2.7*   CHLORIDE mmol/L 117*  --  126* 133*   CO2 mmol/L 20.0*  --  22.0 22.5   BUN mg/dL 24*  --  31* 34*   CREATININE mg/dL 0.45*  --  0.52* 0.56*   CALCIUM mg/dL 7.4*  --  7.9* 8.5*   BILIRUBIN mg/dL  --   --   --  2.0*   ALK PHOS U/L  --   --   --  86   ALT (SGPT) U/L  --   --   --  9   AST (SGOT) U/L  --   --   --  29   GLUCOSE mg/dL 215*  --  167* 194*           Lab Results   Lab Value Date/Time    TROPONINT 140 (C) 10/17/2023 2049    TROPONINT 136 (C) 10/17/2023 1555       Microbiology Results (last 10 days)       Procedure Component Value - Date/Time    CANDIDA AURIS SCREEN - Swab, Axilla Right, Axilla Left and Groin [[id number]]  (Normal) Collected: 10/20/23 1346    Lab Status: Preliminary result Specimen: Swab from Axilla Right, Axilla Left and Groin Updated: 10/24/23 3300      Kaur Auris Screen Culture No Candida auris isolated at 4 days    MRSA Screen, PCR (Inpatient) - Swab, Nares [[id number]]  (Normal) Collected: 10/18/23 0106    Lab Status: Final result Specimen: Swab from Nares Updated: 10/18/23 0241     MRSA PCR No MRSA Detected    Narrative:      The negative predictive value of this diagnostic test is high and should only be used to consider de-escalating anti-MRSA therapy. A positive result may indicate colonization with MRSA and must be correlated clinically.    Blood Culture - Blood, Arm, Left [[id number]]  (Normal) Collected: 10/17/23 1837    Lab Status: Final result Specimen: Blood from Arm, Left Updated: 10/22/23 1901     Blood Culture No growth at 5 days    Respiratory Panel PCR w/COVID-19(SARS-CoV-2) FABRICIO/ROGER/CAMILLA/PAD/COR/MAD/ARELY In-House, NP Swab in UTM/VTM, 3-4 HR TAT - Swab, Nasopharynx [[id number]]  (Normal) Collected: 10/17/23 1740    Lab Status: Final result Specimen: Swab from Nasopharynx Updated: 10/17/23 1848     ADENOVIRUS, PCR Not Detected     Coronavirus 229E Not Detected     Coronavirus HKU1 Not Detected     Coronavirus NL63 Not Detected     Coronavirus OC43 Not Detected     COVID19 Not Detected     Human Metapneumovirus Not Detected     Human Rhinovirus/Enterovirus Not Detected     Influenza A PCR Not Detected     Influenza B PCR Not Detected     Parainfluenza Virus 1 Not Detected     Parainfluenza Virus 2 Not Detected     Parainfluenza Virus 3 Not Detected     Parainfluenza Virus 4 Not Detected     RSV, PCR Not Detected     Bordetella pertussis pcr Not Detected     Bordetella parapertussis PCR Not Detected     Chlamydophila pneumoniae PCR Not Detected     Mycoplasma pneumo by PCR Not Detected    Narrative:      In the setting of a positive respiratory panel with a viral infection PLUS a negative procalcitonin without other underlying concern for bacterial infection, consider observing off antibiotics or discontinuation of antibiotics and continue supportive  care. If the respiratory panel is positive for atypical bacterial infection (Bordetella pertussis, Chlamydophila pneumoniae, or Mycoplasma pneumoniae), consider antibiotic de-escalation to target atypical bacterial infection.    Blood Culture - Blood, Arm, Right [[id number]]  (Normal) Collected: 10/17/23 1650    Lab Status: Final result Specimen: Blood from Arm, Right Updated: 10/22/23 1701     Blood Culture No growth at 5 days    Narrative:      Less than seven (7) mL's of blood was collected.  Insufficient quantity may yield false negative results.             Imaging Results (All)       Procedure Component Value Units Date/Time    XR Chest 1 View [[id number]] Collected: 10/17/23 1638     Updated: 10/17/23 1647    Narrative:      XR CHEST 1 VW-     HISTORY: Female who is 73 years-old, altered mental status     TECHNIQUE: Frontal view of the chest     COMPARISON: None available     FINDINGS: The heart size is normal. Pulmonary vasculature is congested.  Old granulomatous disease is present. Aorta is calcified. Patchy density  at the right lower lung may be edema or developing pneumonia. No large  pleural effusion, or pneumothorax. No acute osseous process.       Impression:      Patchy density at the right lower lung may be edema or  developing pneumonia, follow-up recommended.      This report was finalized on 10/17/2023 4:44 PM by Dr. Isai Diaz M.D on Workstation: [id number]       CT Head Without Contrast [[id number]] Collected: 10/17/23 1630     Updated: 10/17/23 1637    Narrative:      CT HEAD WO CONTRAST-     INDICATIONS: Mental status change     TECHNIQUE: Radiation dose reduction techniques were utilized, including  automated exposure control and exposure modulation based on body size.  Noncontrast head CT     COMPARISON: None available     FINDINGS:           No acute intracranial hemorrhage, midline shift or mass effect. No acute  territorial infarct is identified.     Mild periventricular  hypodensities suggest chronic small vessel ischemic  change in a patient this age.     Arterial calcifications are seen in the base of the brain.     Ventricles, cisterns, cerebral sulci are unremarkable for patient age.     The visualized paranasal sinuses, orbits, mastoid air cells are  unremarkable.                   Impression:         No acute intracranial hemorrhage or hydrocephalus. If there is further  clinical concern, MRI could be considered for further evaluation.     This report was finalized on 10/17/2023 4:34 PM by Dr. Isai Diaz M.D on Workstation: [id number]                 Disposition:    HSB    Patient Instructions:        Discharge Medications        ASK your doctor about these medications        Instructions Start Date   ARIPiprazole 2 MG tablet  Commonly known as: ABILIFY   No dose, route, or frequency recorded.      donepezil 10 MG tablet  Commonly known as: ARICEPT   No dose, route, or frequency recorded.      doxepin 100 MG capsule  Commonly known as: SINEquan   take 1 capsule by mouth at bedtime      HYDROcodone-acetaminophen  MG per tablet  Commonly known as: NORCO   take 1 tablet by mouth every 6 hours if needed for pain      levothyroxine 100 MCG tablet  Commonly known as: SYNTHROID, LEVOTHROID   Take 1 tablet(s) every day by oral route.      loperamide 2 MG capsule  Commonly known as: IMODIUM   No dose, route, or frequency recorded.      LORazepam 0.5 MG tablet  Commonly known as: ATIVAN   Oral      LORazepam 0.5 MG tablet  Commonly known as: ATIVAN   take 1 tablet by mouth if needed for anxiety      mirtazapine 7.5 MG tablet  Commonly known as: REMERON   No dose, route, or frequency recorded.      QUEtiapine 300 MG tablet  Commonly known as: SEROquel   2 tablets, Daily      sertraline 100 MG tablet  Commonly known as: ZOLOFT   No dose, route, or frequency recorded.      tiZANidine 4 MG tablet  Commonly known as: ZANAFLEX   No dose, route, or frequency recorded.       triamcinolone 0.5 % cream  Commonly known as: KENALOG   No dose, route, or frequency recorded.      venlafaxine 50 MG tablet  Commonly known as: EFFEXOR   2 Times Daily               Discharge Order (From admission, onward)      None             Contact information for after-discharge care       Destination       [facility] .    Service: Nursing Home  Contact information:  [address] [place], [place] [id number] [phone]                                   Total time spent discharging patient including evaluation,post hospitalization follow up,  medication and post hospitalization instructions and education total time exceeds 30 minutes.    Signed:  Jai Monroy MD  10/24/2023  19:12 EDT       I wore protective equipment throughout this patient encounter including a face mask, gloves, and protective eyewear.  Hand hygiene was performed before donning protective equipment and after removal when leaving the room.

## 2023-10-24 NOTE — PLAN OF CARE
Goal Outcome Evaluation:           Progress: no change  Outcome Evaluation: Pt unresponsive but comfortable. Turned Q4 hours, premedicated before turns. Dressings on legs clean, dry, and intact. Incontinence pads on arms due to weeping. Patients breathing labored, shallow, and apneic. Patient on room air.

## 2023-10-24 NOTE — PLAN OF CARE
Goal Outcome Evaluation:  Plan of Care Reviewed With: patient        Progress: no change  Outcome Evaluation: Patient unresponsive this shift, given 0.5mg IV Dilaudid, 1mg IV Ativan and 0.4mg IV Robinul approximately every 4 hours before repositioning and patient appears comfortable. Scant weeping from BUE this shift. No visitors overnight, FC intact. O2>90% on RA, will continue comfort measures.

## 2023-10-25 PROBLEM — E43 SEVERE PROTEIN-CALORIE MALNUTRITION: Status: ACTIVE | Noted: 2023-01-01

## 2023-10-25 NOTE — PROGRESS NOTES
Case Management Discharge Note      Final Note: admitted to a [facility] scattered bed on 10/24/23. PATRICK Johnson RN CCP.         Selected Continued Care - Discharged on 10/24/2023 Admission date: 10/17/2023 - Discharge disposition: Swing Bed w/Planned Readmission      Destination Coordination complete.      Service Provider Selected Services Address Phone Fax Patient Preferred    [facility] [address], [facility] [address] [phone] --              Durable Medical Equipment    No services have been selected for the patient.                Dialysis/Infusion    No services have been selected for the patient.                Home Medical Care    No services have been selected for the patient.                Therapy    No services have been selected for the patient.                Community Resources    No services have been selected for the patient.                Community & DME    No services have been selected for the patient.                         Final Discharge Disposition Code: 51 - hospice medical facility

## 2023-10-25 NOTE — PLAN OF CARE
Goal Outcome Evaluation:  Plan of Care Reviewed With: patient        Progress: declining  Outcome Evaluation: Medicated with ativan, dilaudid, and robinul every 4 hours prior to turns. Appears comfortable at rest. Will continue palliative care.

## 2023-10-25 NOTE — PLAN OF CARE
Goal Outcome Evaluation:   Patient resting comfortably, and pre medication with turns q4 0.5 dilaudid, 1 ativan, 0.4 robinul. Patient exhibits weeping and third spacing of fluids, dressings monitored. Fernando present. 10% PPS.

## 2023-10-25 NOTE — CONSULTS
HSB Admission: 10/24/23  [facility] ID: [id number]  Diagnosis: severe protein malnutrition (E43) pneumonia (J18.8), sepsis (A41.9) hypernatremia (E87.0) NSTEMI (I22.2), cirrhosis (K74.60) vascular dementia (F01.50)  Symptom Management: anxiety/dyspnea/pain    [facility] consent forms completed and signed by State Guardian Brooklynn Hany. [facility] information provided and encouraged to reach out with any needs.    Benefit change completed and copy left with Rosa Johnson CCP. [facility] daily visits will begin tomorrow 10/25/23.    Please call with any questions, concerns, or change in patient status. Thank you for allowing us the opportunity to participate in the care of this patient/family.    Nisha Cummings, RN, BSN  [facility] Admissions  [phone]

## 2023-10-25 NOTE — H&P
HISTORY AND PHYSICAL   [facility], [facility]      10/25/2023    Patient Identification:    Name: Gloria Sands  Age: 73 y.o.  Sex: female  :  1950  MRN: [id number]                       Primary Care Physician: Jai Monroy MD    Chief Complaint:      Hospice care      History of Present Illness:      Gloria Sands  is a Patient is a 73-year-old female, resident at the nursing facility.  Patient has history of liver cirrhosis, failure to thrive, bipolar disorder, bilateral macular degeneration, hypertension, hypothyroidism, psychotic disorder with hallucinations, diabetes mellitus, malnourishment, vascular dementia with behavioral disturbance.  Earlier today, patient was found to have blood pressure of 74/60, tachycardic, oxygen saturation was 82% on room air.  Patient was sent to the emergency room, in the ER, patient was found to have: Creatinine 0.65, sodium 75, WBC 14.46, hemoglobin 12.8, platelets 47, troponin 136, initial 3.0, 84.60, BNP 1477, chest x-ray show patchy densities in the right lower lung, CT of head showed no acute intracranial hemorrhage hydrocephalus.  Patient was admitted to the hospital for further management.      Upon admission, patient was given IV fluids since patient was septic and had pneumonia and had hyponatremia.  Patient was given IV antibiotics to cover sepsis and pneumonia.  Cardiology was consulted for possible NSTEMI, hematology was consulted for possible CLL.  Patient was encephalopathic, lethargic and confused when aroused.  Patient was placed on DVT and stress ulcer prophylaxis.  Recommendations from all consultants were followed up.  Since patient did not show improvement despite  adequate treatment, and due to her multiple comorbidities, it was felt that her prognosis was very poor.  So it was recommended hospice care and comfort measures/end-of-life care, this was requested to the state guardian.  This request was approved on .   Patient was transferred to our palliative care unit and was placed on palliative care orders for comfort care/end-of-life therapy.  Hospice was consulted to see whether patient qualified for hospice scattered bed. She did.. Patient was discharged and then readmitted as hospice scattered bed.  Now, patient is a hospice scatter bed.  Patient will continue palliative care orders, focus will be on end-of-life care.  Patient was comfortable and peaceful.      Past Medical History:  Past Medical History:   Diagnosis Date    Arthritis     B12 deficiency     Cirrhosis     Diabetes mellitus     Hypertension     Hypothyroidism     Leukopenia     Lymphocytic leukemia     B cell    Myelodysplasia (myelodysplastic syndrome)     Splenomegaly     Thrombocytopenia      Past Surgical History:  Past Surgical History:   Procedure Laterality Date    BONE MARROW BIOPSY W/ ASPIRATION  10/15/2012    HYSTERECTOMY      LUMBAR SPINE SURGERY      OTHER SURGICAL HISTORY      MULTIPLE SURGERIES FOR PAIN MANAGEMENT APPLIANCES      Home Meds:  Medications Prior to Admission   Medication Sig Dispense Refill Last Dose    ARIPiprazole (ABILIFY) 2 MG tablet        donepezil (ARICEPT) 10 MG tablet        doxepin (SINEquan) 100 MG capsule take 1 capsule by mouth at bedtime  0     HYDROcodone-acetaminophen (NORCO)  MG per tablet take 1 tablet by mouth every 6 hours if needed for pain  0     levothyroxine (SYNTHROID, LEVOTHROID) 100 MCG tablet Take 1 tablet(s) every day by oral route.  0     loperamide (IMODIUM) 2 MG capsule        LORazepam (ATIVAN) 0.5 MG tablet take 1 tablet by mouth if needed for anxiety  0     LORazepam (ATIVAN) 0.5 MG tablet Take  by mouth.       mirtazapine (REMERON) 7.5 MG tablet        QUEtiapine (SEROquel) 300 MG tablet 2 tablets Daily.  0     sertraline (ZOLOFT) 100 MG tablet        tiZANidine (ZANAFLEX) 4 MG tablet   0     triamcinolone (KENALOG) 0.5 % cream        venlafaxine (EFFEXOR) 50 MG tablet 2 (Two) Times a Day.   0        Allergies:  Allergies   Allergen Reactions    Meperidine Hcl Nausea And Vomiting    Sumycin [Tetracycline]      Immunizations:  Immunization History   Administered Date(s) Administered    COVID-19 (PFIZER) BIVALENT 12+YRS 09/27/2022    COVID-19 (PFIZER) Purple Cap Monovalent 12/31/2020, 01/13/2021, 10/19/2021, 05/17/2022    FLUAD TRI 65YR+ 08/12/2017    Hep A / Hep B 04/21/2011, 05/19/2011, 11/22/2011    Pneumococcal Polysaccharide (PPSV23) 05/08/2017     Social History:   Social History     Social History Narrative    Not on file     Social History     Tobacco Use    Smoking status: Every Day    Smokeless tobacco: Not on file   Substance Use Topics    Alcohol use: Defer     Family History:  No family history on file.     Review of Systems  See history of present illness and past medical history.    Hospice care    Objective:    Exam:    T MAX 24 hrs: No data recorded.    Vitals Ranges:   Heart Rate:  [66] 66  Resp:  [8] 8    Pulse 66   Resp 8   SpO2 96%     General: Unresponsive.  Cachectic.    Neck: Supple, symmetrical, trachea midline, no adenopathy;              thyroid:  no enlargement/tenderness/nodules;              no carotid bruit or JVD  Cardiovascular: Normal rate, regular rhythm and intact distal pulses.              Exam reveals no gallop and no friction rub. No murmur heard  Pulmonary: Diminished breath sounds bilaterally, rhonchi bibasilarly.  No wheezing, no rales  Abdominal: Soft, splenomegaly.  Bowel sounds diminished.  No masses.    Extremities:  cachectic.  No edema.  Areas of ecchymosis.    Pulses: 1 + symmetric all extremities  Neurological: Patient is unresponsive.  No new focal sensorimotor deficit.    Skin: Skin color, texture, normal. Turgor is decreased. No rashes or lesions         Data Review:    Results from last 7 days   Lab Units 10/20/23  0627 10/19/23  0127   WBC 10*3/mm3 16.50* 11.95*   HEMOGLOBIN g/dL 10.0* 9.8*   HEMATOCRIT % 27.9* 28.8*   PLATELETS 10*3/mm3 19* 20*        Results from last 7 days   Lab Units 10/20/23  0627 10/19/23  1535 10/19/23  0127   SODIUM mmol/L 145  --  160*   POTASSIUM mmol/L 2.9* 3.5 2.7*   CHLORIDE mmol/L 117*  --  126*   CO2 mmol/L 20.0*  --  22.0   BUN mg/dL 24*  --  31*   CREATININE mg/dL 0.45*  --  0.52*   CALCIUM mg/dL 7.4*  --  7.9*   GLUCOSE mg/dL 215*  --  167*                 Lab Results   Lab Value Date/Time    TROPONINT 140 (C) 10/17/2023 2049    TROPONINT 136 (C) 10/17/2023 1555       Brief Urine Lab Results  (Last result in the past 365 days)        Color   Clarity   Blood   Leuk Est   Nitrite   Protein   CREAT   Urine HCG        10/17/23 1746 Dark Yellow   Clear   Negative   Trace   Negative   30 mg/dL (1+)                    Imaging Results (All)       None            Assessment:      Severe protein-calorie malnutrition  Sepsis syndrome  Right lower lobe pneumonia  Severe hypernatremia  Dehydration  Elevated troponin, probably non-STEMI type II.  Thrombocytopenia  Lymphocytosis  Liver cirrhosis with splenomegaly   Bipolar disorder  Hypertension  Hypothyroidism  Depression  Diabetes mellitus  Vascular dementia without behavioral disturbance  Failure to thrive    Plan:    Inpatient admission, hospice scatter bed  We will continue Perative care orders  Focus will be on end-of-life therapy  Patient is comfortable  Patient is declining.    Not family at bedside.      Jai Monroy MD  10/25/2023  17:48 EDT

## 2023-10-25 NOTE — PROGRESS NOTES
SB team SW met with pt  to explain role of SB team SW and assess for needs. Pt was lying in her hospital bed, unresponsive with shallow breathing.  No family at the bedside. Pt is a plasencia of the state and her legal guardian is Brooklynn Leach. SW called guardian to discuss care and unable to reach. SW left a VM and awaiting a return call. SW unable to complete PHQ9 due to pt being unresponsive.  SW unable to obtain  Home information or complete bereavement risk assessment. SW will visit on a weekly and PRN basis to offer EOL support and assist with needs.

## 2023-10-25 NOTE — PROGRESS NOTES
[facility] Visit Report    Gloria Sands  [id number]  10/25/2023    Admission R/T [facility] Dx: yes    Reason for [facility] Admission: Unspecified severe protein calorie malnutrition    Review of Visit: Patient is a 74yo female with a primary [facility] diagnosis of unspecified severe protein calorie malnutrition. Admitted to [facility] for GIP for EOL care and symptom management of pain, dyspnea, anxiety and congestion.    PPS: 10%    VS: HR 66, RR 8, UTO BP, 96% on room air.     Medications in 24 hours:  -IV Robinul 0.4mg x6  -IV Dilaudid 0.5mg x6  -IV Ativan 1mg x6    Recommendations:  Continue to monitor for signs of decline and provide comfort measures. Contact [facility] at [phone] with any questions or concerns and at TOD.     Assessment:  Patient is bed bound, oral care only, unresponsive, PPS 10%, appears imminent. Patient appears frail and cachectic with bitemporal wasting. Respirations are shallow and unlabored with open mouth breathing and periods of apnea. Lung sounds diminished throughout, on room air. Abdomen is soft with absent bowel sounds, no PO intake. Extremities are cold to touch with edema, mottling and cyanosis. Unable to palpate peripheral pulses. Per documentation in Epic, stage 2 pressure injury to the sacral spine, covered with dressing. Patient also noted in Epic to have venous ulcers to the BLE. Unable to assess wounds during visit, covered with dressings. Fernando catheter to bedside drainage with scant brown urine output, 25mL documented in 24 hours.     Collaboration:  Call placed to pts guardian, Brooklynn, with no answer. Voicemail left, awaiting return call. Collaborated with [facility] RN, [place] and CCP about pts condition.    Disposition:  Patient meets GIP criteria d/t frequent administration and continued titration of IV medications to achieve and maintain symptom management. Patient appears to be unsafe for transport at this time, requiring increasing symptom management and frequent RN  assessment. If patient were to stabilize she would likely require LTC placement d/t increased daily care needs. Will continue [facility] RN visits to monitor for changes, assess needs and provide support.       Emma Solis RN  [facility] Health Visit Nurse  Scattered Bed Team

## 2023-10-26 NOTE — PROGRESS NOTES
\[facility]\"" Visit Report    Gloria Sands  [id number]  10/26/2023    Admission R/T \[facility]\"" Dx: yes     Reason for [facility] Admission: Unspecified severe protein calorie malnutrition     Review of Visit: Patient is a 74yo female with a primary [facility] diagnosis of unspecified severe protein calorie malnutrition. Admitted to [facility] for [facility] for EOL care and symptom management of pain, dyspnea, anxiety and congestion.     PPS: 10%     VS: HR 65, 91% on room air.     Medications in 24 hours:  -IV Robinul 0.4mg x6  -IV Dilaudid 0.5mg x1  -IV Dilaudid 1mg x4  -IV Ativan 1mg x6     Recommendations:  Continue to monitor for signs of decline and provide comfort measures. Contact [facility] at [phone] with any questions or concerns and at TOD.      Assessment:  Patient is bed bound, oral care only, unresponsive, PPS 10%, appears imminent. Patient appears frail and cachectic with bitemporal wasting. Respirations are shallow and unlabored with open mouth breathing and periods of apnea. Lung sounds diminished throughout, on room air. Abdomen is soft with absent bowel sounds, no PO intake. Extremities are cold to touch with edema, weeping, mottling and cyanosis. Unable to palpate peripheral pulses. Per documentation in Epic, stage 2 pressure injury to the sacral spine, covered with dressing. Patient also noted in Epic to have venous ulcers to the BLE. Unable to assess wounds during visit, covered with dressings. Fernando catheter to bedside drainage with scant brown urine output, 25mL documented in 24 hours.      Collaboration:  Spoke with pts state guardian, Brooklynn, via phone with condition update and support provided. Training provided regarding assessment findings, disease progression, symptom management and expected length of stay. Brooklynn v/u of pts condition and reports that if the patient passes after hours to contact the guardianship after hours line at [phone] to notify. Brooklynn also reports that the patient  has a full prepaid burial with [facility] on [address]. Collaborated with [facility] RN, Yesenia and CCP about pts condition.     Disposition:  Patient meets GIP criteria d/t frequent administration and continued titration of IV medications to achieve and maintain symptom management. Patient appears to be unsafe for transport at this time, requiring increasing symptom management and frequent RN assessment. If patient were to stabilize she would likely require LTC placement d/t increased daily care needs. Will continue [facility] RN visits to monitor for changes, assess needs and provide support.       Emma Solis, RN  [facility] Health Visit Nurse  Scattered Bed Team

## 2023-10-26 NOTE — PROGRESS NOTES
DAILY PROGRESS NOTE  [facility], [facility]    10/26/2023    Patient Identification:  Name: Gloria Sands  Age: 73 y.o.  Sex: female  :  1950  MRN: [id number]           Primary Care Physician: Jai Monroy MD    Subjective:    Interval History:    Patient is having agonal breathing.  Comfortable.  Medicated as per palliative care orders.  Unable to obtain blood pressure this morning.    Objective:    Scheduled Meds:         Continuous Infusions:         PRN Meds:    acetaminophen **OR** acetaminophen **OR** acetaminophen    diphenhydrAMINE **OR** diphenhydrAMINE    diphenoxylate-atropine    Glycerin-Hypromellose-    glycopyrrolate **OR** glycopyrrolate **OR** glycopyrrolate **OR** glycopyrrolate    HYDROmorphone **OR** Morphine **OR** morphine    Lidocaine Viscous HCl    LORazepam **OR** LORazepam **OR** LORazepam    LORazepam **OR** LORazepam **OR** LORazepam    LORazepam **OR** LORazepam **OR** LORazepam    ondansetron **OR** ondansetron    sodium chloride    Intake/Output:    Intake/Output Summary (Last 24 hours) at 10/26/2023 1350  Last data filed at 10/25/2023 1820  Gross per 24 hour   Intake --   Output 25 ml   Net -25 ml           Exam:    T MAX 24 hrs: No data recorded.    Vitals Ranges:   Heart Rate:  [65] 65    Pulse 65   Resp 8   SpO2 91%     General: Unresponsive.  Cachectic.    Neck: Supple, symmetrical, trachea midline, no adenopathy;              thyroid:  no enlargement/tenderness/nodules;              no carotid bruit or JVD  Cardiovascular: Normal rate, regular rhythm and intact distal pulses.              Exam reveals no gallop and no friction rub. No murmur heard  Pulmonary: Diminished breath sounds bilaterally, rhonchi bibasilarly.  No wheezing, no rales  Abdominal: Soft, splenomegaly.  Bowel sounds diminished.  No masses.    Extremities:  cachectic.  No edema.  Areas of ecchymosis.    Pulses: 1 + symmetric all  extremities  Neurological: Patient is unresponsive.  No new focal sensorimotor deficit.    Skin: Skin color, texture, normal. Turgor is decreased. No rashes or lesions        Assessment:    Sepsis syndrome  Right lower lobe pneumonia  Severe hypernatremia  Dehydration  Elevated troponin, probably non-STEMI type II.  Thrombocytopenia  Lymphocytosis  Liver cirrhosis with splenomegaly   Bipolar disorder  Hypertension  Hypothyroidism  Depression  Diabetes mellitus  Vascular dementia without behavioral disturbance  Failure to thrive    Plan:    Patient is dying.  However, she has been comfortable.  Continue comfort care/end-of-life therapy.  No family at bedside        Jai Monroy MD  10/26/2023  13:50 EDT

## 2023-10-27 NOTE — PROGRESS NOTES
Case Management Discharge Note      Final Note: The patient  on 10/27/23 @ 04:05. BBrendon Johnson RN, CCP         Selected Continued Care - Discharged on 10/27/2023 Admission date: 10/24/2023 - Discharge disposition:       Destination Coordination complete.      Service Provider Selected Services Address Phone Fax Patient Preferred    [facility] [address], [place] [id number] [phone] --              Durable Medical Equipment    No services have been selected for the patient.                Dialysis/Infusion    No services have been selected for the patient.                Home Medical Care    No services have been selected for the patient.                Therapy    No services have been selected for the patient.                Community Resources    No services have been selected for the patient.                Community & DME    No services have been selected for the patient.                    Selected Continued Care - Prior Encounters Includes continued care and service providers with selected services from prior encounters from 2023 to 10/27/2023      Discharged on 10/24/2023 Admission date: 10/17/2023 - Discharge disposition: Swing Bed w/Planned Readmission      Destination       Service Provider Selected Services Address Phone Fax Patient Preferred    [facility] [address], [facility] [id number] [phone] --                               Final Discharge Disposition Code: 41 -  in medical facility

## 2023-11-05 NOTE — DISCHARGE SUMMARY
PHYSICIAN DISCHARGE SUMMARY  [facility], [facility]    10/27/2023    Patient Identification:    Name: Gloria Sands  Age: 73 y.o.  Sex: female  :  1950  MRN: [id number]    Primary Care Physician: Jai Monroy MD    Admit date: 10/24/2023    Discharge date and time: 10/27/2023    Discharged Condition:      Discharge Diagnoses:    Severe protein-calorie malnutrition  Sepsis syndrome  Right lower lobe pneumonia  Severe hypernatremia  Dehydration  Elevated troponin, probably non-STEMI type II.  Thrombocytopenia  Lymphocytosis  Liver cirrhosis with splenomegaly   Bipolar disorder  Hypertension  Hypothyroidism  Depression  Diabetes mellitus  Vascular dementia without behavioral disturbance  Failure to thrive         Hospital Course: Gloria Sands  is a Patient is a 73-year-old female, resident at the nursing facility.  Patient has history of liver cirrhosis, failure to thrive, bipolar disorder, bilateral macular degeneration, hypertension, hypothyroidism, psychotic disorder with hallucinations, diabetes mellitus, malnourishment, vascular dementia with behavioral disturbance.  Earlier today, patient was found to have blood pressure of 74/60, tachycardic, oxygen saturation was 82% on room air.  Patient was sent to the emergency room, in the ER, patient was found to have: Creatinine 0.65, sodium 75, WBC 14.46, hemoglobin 12.8, platelets 47, troponin 136, initial 3.0, 84.60, BNP 1477, chest x-ray show patchy densities in the right lower lung, CT of head showed no acute intracranial hemorrhage hydrocephalus.  Patient was admitted to the hospital for further management.      Upon admission, patient was given IV fluids since patient was septic and had pneumonia and had hyponatremia.  Patient was given IV antibiotics to cover sepsis and pneumonia.  Cardiology was consulted for possible NSTEMI, hematology was consulted for possible CLL.  Patient was  encephalopathic, lethargic and confused when aroused.  Patient was placed on DVT and stress ulcer prophylaxis.  Recommendations from all consultants were followed up.  Since patient did not show improvement despite  adequate treatment, and due to her multiple comorbidities, it was felt that her prognosis was very poor.  So it was recommended hospice care and comfort measures/end-of-life care, this was requested to the state guardian.  This request was approved on .  Patient was transferred to our palliative care unit and was placed on palliative care orders for comfort care/end-of-life therapy.  Hospice was consulted to see whether patient qualifies for hospice scattered bed.  She did.  Patient was discharged and then readmitted as hospice scattered bed.  Under hospice catheter bed, patient was continue with comfort care/end-of-life therapy.  Patient was very comfortable.  Patient continued to decline and eventually  today at 04:05 AM.      Signed:  Jai Monroy MD

## 2023-11-05 NOTE — PROGRESS NOTES
"Enter Query Response Below      Query Response: Encephalopathy due to sepsis              If applicable, please update the problem list.   Patient: Gloria Sands        : 1950  Account: [id number]           Admit Date: 10/17/2023        How to Respond to this query:       a. Click New Note     b. Answer query within the yellow box.                c. Update the Problem List, if applicable.      If you have any questions about this query contact me at: [place] [phone]     Dr. Monroy,         73 yr female noted with sepsis and documentation of \"Patient was encephalopathic, lethargic and confused when aroused\" noted on the discharge summary. IV antibiotics and fluids given. Patient did not show improvement despite adequate treatment with very poor prognosis and recommended hospice care and comfort/end of life care.    Encephalopathy due to sepsis  Encephalopathy due to _______  Other- specify_______  Unable to determine      By submitting this query, we are merely seeking further clarification of documentation to accurately reflect all conditions that you are monitoring, evaluating, treating or that extend the hospitalization or utilize additional resources of care. Please utilize your independent clinical judgment when addressing the question(s) above.     This query and your response, once completed, will be entered into the legal medical record.    Sincerely,  Kenya DAY Rn  Clinical Documentation Integrity Program   "

## 2024-01-01 NOTE — PROGRESS NOTES
REASON FOR FOLLOWUP/CHIEF COMPLAINT:  Lymphocytosis    HISTORY OF PRESENT ILLNESS:   WBC has significantly dropped from the peak of 40 at admission.  History not obtainable from patient as she is nonverbal.  Therefore history obtained from chart review.    Past Medical History, Past Surgical History, Social History, Family History have been reviewed and are without significant changes except as mentioned.    Review of Systems   Review of Systems   Unable to perform ROS: Patient nonverbal       Medications:  The current medication list was reviewed in the EMR    ALLERGIES:    Allergies   Allergen Reactions    Meperidine Hcl Nausea And Vomiting    Sumycin [Tetracycline]               Vitals:    10/18/23 1949 10/18/23 2332 10/19/23 0412 10/19/23 0750   BP: (!) 107/39 108/43 112/44 105/57   BP Location: Right arm Right arm Right arm Right arm   Patient Position: Lying Lying Lying Lying   Pulse:  65 68 61   Resp: 16 16 16 16   Temp:   96.6 °F (35.9 °C) 97.7 °F (36.5 °C)   TempSrc:   Oral Oral   SpO2:  93% 99% 100%   Weight:       Height:         Physical Exam    CONSTITUTIONAL:  Vital signs reviewed.  No distress, looks comfortable.  EYES:  Conjunctivae and lids unremarkable.  PERRLA  EARS,NOSE,MOUTH,THROAT:  Ears and nose appear unremarkable.  Lips, teeth, gums appear unremarkable.  RESPIRATORY:  Normal respiratory effort.  Lungs clear to auscultation bilaterally.  CARDIOVASCULAR:  Normal S1, S2.  No murmurs rubs or gallops.  No significant lower extremity edema.  GASTROINTESTINAL: Abdomen appears unremarkable.  Nontender.  No hepatomegaly.  No splenomegaly.  NEURO: Could not assess as patient is nonverbal   MUSCULOSKELETAL:  Unremarkable digits/nails.  No cyanosis or clubbing.  SKIN:  Warm.  No rashes.  PSYCHIATRIC:Could not assess as patient is nonverbal        RECENT LABS:  WBC   Date Value Ref Range Status   10/19/2023 11.95 (H) 3.40 - 10.80 10*3/mm3 Final   10/18/2023 15.80 (H) 3.40 - 10.80 10*3/mm3 Final  Here with mom pt age 7 mos   just fell off moms    to  floor but landed on a soft dog bed.  no LOC  pt cried. Pt in moms arms now and acting appropriately.   NO SX of pain or distress.   D/w mom at length sx of more serious  head injury and when to be seen in   ER . Much support and reassurance  given. Mom very grateful or call . Follow up PRN      10/17/2023 40.46 (C) 3.40 - 10.80 10*3/mm3 Final     Hemoglobin   Date Value Ref Range Status   10/19/2023 9.8 (L) 12.0 - 15.9 g/dL Final   10/18/2023 11.2 (L) 12.0 - 15.9 g/dL Final   10/17/2023 12.9 12.0 - 15.9 g/dL Final     Platelets   Date Value Ref Range Status   10/19/2023 20 (C) 140 - 450 10*3/mm3 Final   10/18/2023 28 (C) 140 - 450 10*3/mm3 Final   10/17/2023 47 (C) 140 - 450 10*3/mm3 Final       ASSESSMENT/PLAN:  Gloria Sands N626/1     *Thrombocytopenia  Likely a result of platelet sequestration due to splenomegaly  The platelet count was 54,000 at her initial visit and has been stable for a number of years  Recent platelets 45,000 on 7/12/2023  Outpatient Dr. Cortes appointment: 7/17/2023: Platelets stable at 51,000  Admission for sepsis syndrome and pneumonia: 10/17/2023: PLT 47, stable  10/19/2023: PLT 20, suspect the drop is due to sepsis/pneumonia     *Normocytic anemia  7/12/2023: Hemoglobin 9.1, lower than previous  7/17/2023: Hemoglobin 9.8, MCV higher at 103.1  10/17/2023: Hb up to 12.9.  This might be due to hemoconcentration and Hb might drop during this admission.  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  7/17/2023: The white blood cell count is higher at 14.89 with 23% neutrophils and 69% lymphocytes.  10/17/2023: WBC 40.5 with 80% lymphocytes.  With CLL, sometimes the WBC (and absolute lymphocyte count), significantly rises when someone is ill with something such as sepsis syndrome and pneumonia.  PLT is stable.  Increased WBC alone would not be an indication for chemotherapy.  We will monitor.  10/19/2023: WBC back down to 11.95 with ALC 9444 (79% lymphocytes)     *Paranoid schizophrenia     *Dementia     *Admitted 10/17/2023 for sepsis syndrome and pneumonia.  SBP was around 74 and O2 sat around  82% on room air prompting her nursing facility to send her to the ER, with subsequent admission.     Plan  Could transfuse platelets if needed.    Noted cardiology note recommending transferring to palliative also noted internal medicine note recommending comfort care.  Even if she has CLL, there is no indication to treat (and she is not a candidate for treatment).